# Patient Record
Sex: FEMALE | Race: WHITE | NOT HISPANIC OR LATINO | Employment: FULL TIME | URBAN - METROPOLITAN AREA
[De-identification: names, ages, dates, MRNs, and addresses within clinical notes are randomized per-mention and may not be internally consistent; named-entity substitution may affect disease eponyms.]

---

## 2017-11-12 ENCOUNTER — OFFICE VISIT (OUTPATIENT)
Dept: URGENT CARE | Facility: CLINIC | Age: 37
End: 2017-11-12
Payer: COMMERCIAL

## 2017-11-12 VITALS
HEIGHT: 62 IN | RESPIRATION RATE: 16 BRPM | WEIGHT: 118 LBS | DIASTOLIC BLOOD PRESSURE: 65 MMHG | TEMPERATURE: 97 F | SYSTOLIC BLOOD PRESSURE: 101 MMHG | OXYGEN SATURATION: 98 % | BODY MASS INDEX: 21.71 KG/M2 | HEART RATE: 91 BPM

## 2017-11-12 DIAGNOSIS — J02.9 ACUTE VIRAL PHARYNGITIS: Primary | ICD-10-CM

## 2017-11-12 DIAGNOSIS — R05.9 COUGH: ICD-10-CM

## 2017-11-12 LAB
CTP QC/QA: YES
S PYO RRNA THROAT QL PROBE: NEGATIVE

## 2017-11-12 PROCEDURE — 87880 STREP A ASSAY W/OPTIC: CPT | Mod: QW,S$GLB,, | Performed by: EMERGENCY MEDICINE

## 2017-11-12 PROCEDURE — 99203 OFFICE O/P NEW LOW 30 MIN: CPT | Mod: S$GLB,,, | Performed by: EMERGENCY MEDICINE

## 2017-11-12 RX ORDER — CODEINE PHOSPHATE AND GUAIFENESIN 10; 100 MG/5ML; MG/5ML
5-10 SOLUTION ORAL 3 TIMES DAILY PRN
Qty: 240 ML | Refills: 0 | Status: SHIPPED | OUTPATIENT
Start: 2017-11-12 | End: 2017-11-17

## 2017-11-12 NOTE — LETTER
November 12, 2017      [facility]  [address] [address], [address]  [place] [address]  Phone: [phone]  Fax: [phone]       Patient: Nickie Clemente   YOB: 1980  Date of Visit: 11/12/2017    To Whom It May Concern:    Breana Clemente  was at [facility] on 11/12/2017. She may return to work/school on 11/14/17 with no restrictions. If you have any questions or concerns, or if I can be of further assistance, please do not hesitate to contact me.    Sincerely,          Francisco Farr MD

## 2017-11-12 NOTE — PROGRESS NOTES
"Subjective:       Patient ID: Nickie Clemente is a 37 y.o. female.    Vitals:  height is 5' 2" (1.575 m) and weight is 53.5 kg (118 lb). Her oral temperature is 97 °F (36.1 °C). Her blood pressure is 101/65 and her pulse is 91. Her respiration is 16 and oxygen saturation is 98%.     Chief Complaint: Cough    Patient states she took motrin for the headache which gave her relief.  2 d hx sore throat, and occas mucus prod cough since yesterday, no fever/runny nose, is not pregnant, NOC.  Undecided about steroid IM.      Cough   This is a new problem. The current episode started yesterday. The problem has been gradually worsening. The problem occurs every few minutes. The cough is productive of sputum. Associated symptoms include headaches and a sore throat. Pertinent negatives include no chest pain, chills, ear pain, eye redness, fever, myalgias, shortness of breath or wheezing. The symptoms are aggravated by lying down. She has tried nothing for the symptoms.     Review of Systems   Constitution: Negative for chills, fever and malaise/fatigue.   HENT: Positive for sore throat. Negative for congestion, ear pain and hoarse voice.    Eyes: Negative for discharge and redness.   Cardiovascular: Negative for chest pain, dyspnea on exertion and leg swelling.   Respiratory: Positive for sputum production. Negative for cough, shortness of breath and wheezing.    Musculoskeletal: Negative for myalgias.   Gastrointestinal: Negative for abdominal pain and nausea.   Neurological: Positive for headaches.       Objective:      Physical Exam   Constitutional: She is oriented to person, place, and time. She appears well-developed and well-nourished.   Occas nonprod cough   HENT:   Head: Normocephalic and atraumatic.   Right Ear: Tympanic membrane, external ear and ear canal normal.   Left Ear: Tympanic membrane, external ear and ear canal normal.   Nose: Nose normal. Right sinus exhibits no maxillary sinus tenderness and no " frontal sinus tenderness. Left sinus exhibits no maxillary sinus tenderness and no frontal sinus tenderness.   Mouth/Throat: Uvula is midline and mucous membranes are normal. Posterior oropharyngeal erythema present.   Eyes: EOM are normal. Pupils are equal, round, and reactive to light.   Neck: Normal range of motion. Neck supple.   Cardiovascular: Normal rate, regular rhythm and normal heart sounds.    Pulmonary/Chest: Breath sounds normal.   Musculoskeletal: Normal range of motion.   Lymphadenopathy:     She has no cervical adenopathy.   Neurological: She is alert and oriented to person, place, and time.   Skin: Skin is warm and dry.   Psychiatric: She has a normal mood and affect. Her behavior is normal.       Assessment:       1. Acute viral pharyngitis    2. Cough        Plan:         Acute viral pharyngitis  -     POCT rapid strep A    Cough  -     guaifenesin-codeine 100-10 mg/5 ml (TUSSI-ORGANIDIN NR)  mg/5 mL syrup; Take 5-10 mLs by mouth 3 (three) times daily as needed for Cough.  Dispense: 240 mL; Refill: 0      Francisco Farr MD  Go to the Emergency Department for any problems

## 2017-11-12 NOTE — PATIENT INSTRUCTIONS

## 2017-11-15 ENCOUNTER — TELEPHONE (OUTPATIENT)
Dept: URGENT CARE | Facility: CLINIC | Age: 37
End: 2017-11-15

## 2023-04-14 ENCOUNTER — CLINICAL SUPPORT (OUTPATIENT)
Dept: OBSTETRICS AND GYNECOLOGY | Facility: CLINIC | Age: 43
End: 2023-04-14
Payer: COMMERCIAL

## 2023-04-14 DIAGNOSIS — N91.2 AMENORRHEA: Primary | ICD-10-CM

## 2023-05-11 ENCOUNTER — OFFICE VISIT (OUTPATIENT)
Dept: OBSTETRICS AND GYNECOLOGY | Facility: CLINIC | Age: 43
End: 2023-05-11
Payer: COMMERCIAL

## 2023-05-11 ENCOUNTER — HOSPITAL ENCOUNTER (OUTPATIENT)
Dept: PERINATAL CARE | Facility: OTHER | Age: 43
Discharge: HOME | End: 2023-05-11
Attending: OBSTETRICS & GYNECOLOGY
Payer: COMMERCIAL

## 2023-05-11 VITALS
WEIGHT: 132.94 LBS | BODY MASS INDEX: 24.46 KG/M2 | SYSTOLIC BLOOD PRESSURE: 112 MMHG | HEIGHT: 62 IN | DIASTOLIC BLOOD PRESSURE: 74 MMHG

## 2023-05-11 DIAGNOSIS — B00.1 FEVER BLISTER: ICD-10-CM

## 2023-05-11 DIAGNOSIS — N91.2 AMENORRHEA: ICD-10-CM

## 2023-05-11 DIAGNOSIS — Z32.00 VISIT FOR CONFIRMATION OF PREGNANCY TEST RESULT WITH PHYSICAL EXAM: Primary | ICD-10-CM

## 2023-05-11 DIAGNOSIS — Z12.4 CERVICAL CANCER SCREENING: ICD-10-CM

## 2023-05-11 LAB
B-HCG UR QL: POSITIVE
CTP QC/QA: YES

## 2023-05-11 PROCEDURE — 99999 PR PBB SHADOW E&M-EST. PATIENT-LVL III: CPT | Mod: PBBFAC,,, | Performed by: OBSTETRICS & GYNECOLOGY

## 2023-05-11 PROCEDURE — 3078F DIAST BP <80 MM HG: CPT | Mod: CPTII,S$GLB,, | Performed by: OBSTETRICS & GYNECOLOGY

## 2023-05-11 PROCEDURE — 99204 PR OFFICE/OUTPT VISIT, NEW, LEVL IV, 45-59 MIN: ICD-10-PCS | Mod: S$GLB,,, | Performed by: OBSTETRICS & GYNECOLOGY

## 2023-05-11 PROCEDURE — 76801 OB US < 14 WKS SINGLE FETUS: CPT | Mod: 26,,, | Performed by: OBSTETRICS & GYNECOLOGY

## 2023-05-11 PROCEDURE — 76801 US OB/GYN PROCEDURE (VIEWPOINT): ICD-10-PCS | Mod: 26,,, | Performed by: OBSTETRICS & GYNECOLOGY

## 2023-05-11 PROCEDURE — 3074F PR MOST RECENT SYSTOLIC BLOOD PRESSURE < 130 MM HG: ICD-10-PCS | Mod: CPTII,S$GLB,, | Performed by: OBSTETRICS & GYNECOLOGY

## 2023-05-11 PROCEDURE — 88175 CYTOPATH C/V AUTO FLUID REDO: CPT | Performed by: OBSTETRICS & GYNECOLOGY

## 2023-05-11 PROCEDURE — 99204 OFFICE O/P NEW MOD 45 MIN: CPT | Mod: S$GLB,,, | Performed by: OBSTETRICS & GYNECOLOGY

## 2023-05-11 PROCEDURE — 87624 HPV HI-RISK TYP POOLED RSLT: CPT | Performed by: OBSTETRICS & GYNECOLOGY

## 2023-05-11 PROCEDURE — 3074F SYST BP LT 130 MM HG: CPT | Mod: CPTII,S$GLB,, | Performed by: OBSTETRICS & GYNECOLOGY

## 2023-05-11 PROCEDURE — 81025 URINE PREGNANCY TEST: CPT | Mod: S$GLB,,, | Performed by: OBSTETRICS & GYNECOLOGY

## 2023-05-11 PROCEDURE — 76801 OB US < 14 WKS SINGLE FETUS: CPT

## 2023-05-11 PROCEDURE — 87086 URINE CULTURE/COLONY COUNT: CPT | Performed by: OBSTETRICS & GYNECOLOGY

## 2023-05-11 PROCEDURE — 87591 N.GONORRHOEAE DNA AMP PROB: CPT | Performed by: OBSTETRICS & GYNECOLOGY

## 2023-05-11 PROCEDURE — 3008F PR BODY MASS INDEX (BMI) DOCUMENTED: ICD-10-PCS | Mod: CPTII,S$GLB,, | Performed by: OBSTETRICS & GYNECOLOGY

## 2023-05-11 PROCEDURE — 81025 POCT URINE PREGNANCY: ICD-10-PCS | Mod: S$GLB,,, | Performed by: OBSTETRICS & GYNECOLOGY

## 2023-05-11 PROCEDURE — 99999 PR PBB SHADOW E&M-EST. PATIENT-LVL III: ICD-10-PCS | Mod: PBBFAC,,, | Performed by: OBSTETRICS & GYNECOLOGY

## 2023-05-11 PROCEDURE — 3008F BODY MASS INDEX DOCD: CPT | Mod: CPTII,S$GLB,, | Performed by: OBSTETRICS & GYNECOLOGY

## 2023-05-11 PROCEDURE — 3078F PR MOST RECENT DIASTOLIC BLOOD PRESSURE < 80 MM HG: ICD-10-PCS | Mod: CPTII,S$GLB,, | Performed by: OBSTETRICS & GYNECOLOGY

## 2023-05-11 RX ORDER — VALACYCLOVIR HYDROCHLORIDE 1 G/1
2000 TABLET, FILM COATED ORAL 2 TIMES DAILY
Qty: 4 TABLET | Refills: 6 | Status: SHIPPED | OUTPATIENT
Start: 2023-05-11 | End: 2023-10-31

## 2023-05-11 NOTE — PROGRESS NOTES
Chief Complaint: Absence of Menses     HPI:      Nickie Clemente is a 42 y.o.  who presents complaining of absence of menses. Mild nausea and no emesis.  Mild right sided pelvic pain.  Denies cramping.  Denies vaginal bleeding.     Patient's last menstrual period was 2023.    Flu shot: declines  COVID vaccine: declined  ZIKA travel or exposure: denies    History reviewed. No pertinent past medical history.  Past Surgical History:   Procedure Laterality Date     SECTION      x2    LUMPECTOMY, BREAST Right      Social History     Tobacco Use    Smoking status: Never    Smokeless tobacco: Never   Substance Use Topics    Alcohol use: No    Drug use: Never     Family History   Problem Relation Age of Onset    Diabetes Mother     Heart disease Mother     Glaucoma Father     Breast cancer Neg Hx     Ovarian cancer Neg Hx     Colon cancer Neg Hx      OB History    Para Term  AB Living   3         3   SAB IAB Ectopic Multiple Live Births           3      # Outcome Date GA Lbr Ector/2nd Weight Sex Delivery Anes PTL Lv   3             2             1                Obstetric Comments   13/R/6   No abnormal pap   No STDs    x 1, CD x 2       Current Outpatient Medications:     prenatal vit37/iron/folic acid (PRENATA ORAL), Take by mouth., Disp: , Rfl:     ondansetron (ZOFRAN-ODT) 4 MG TbDL, Take 1 tablet (4 mg total) by mouth every 8 (eight) hours as needed., Disp: 14 tablet, Rfl: 1    valACYclovir (VALTREX) 1000 MG tablet, Take 2 tablets (2,000 mg total) by mouth 2 (two) times daily. for 1 day, Disp: 4 tablet, Rfl: 6  ROS:     GENERAL: Denies weight gain or weight loss. Feeling well overall.   SKIN: Denies rash or lesions.   BREASTS: Denies lumps or nipple discharge. + tenderness.  ABDOMEN: Denies abdominal pain, constipation, diarrhea. nausea and no vomiting  URINARY: Denies dysuria, hematuria. + frequency.  NEUROLOGIC: Denies syncope or weakness.   PSYCHIATRIC:  "Denies depression, anxiety or mood swings.    Physical Exam:      PHYSICAL EXAM:  /74   Ht 5' 2" (1.575 m)   Wt 60.3 kg (132 lb 15 oz)   LMP 2023   BMI 24.31 kg/m²   Body mass index is 24.31 kg/m².     APPEARANCE: Well nourished, well developed, in no acute distress.  PSYCH: Appropriate mood and affect.  EXTREMITIES: No edema.  PELVIC: Vulva without lesions. Vagina without lesions, discharge or bleeding. Cervical os closed without lesions, bleeding or discharge.    Results for orders placed or performed in visit on 23   POCT Urine Pregnancy   Result Value Ref Range    POC Preg Test, Ur Positive (A) Negative     Acceptable Yes        Assessment/Plan:       ICD-10-CM ICD-9-CM    1. Visit for confirmation of pregnancy test result with physical exam  Z32.00 V72.40 POCT Urine Pregnancy      C. trachomatis/N. gonorrhoeae by AMP DNA Other; Cervix      CBC Auto Differential      Hepatitis B Surface Antigen      HIV 1/2 Ag/Ab (4th Gen)      RPR      Rubella Antibody, IgG      TSH      Type & Screen - Ob Profile      Urine culture      Hepatitis C Antibody      2. Cervical cancer screening  Z12.4 V76.2 Liquid-Based Pap Smear, Screening      HPV High Risk Genotypes, PCR      3. Fever blister  B00.1 054.9 valACYclovir (VALTREX) 1000 MG tablet            Counselin. UPT positive.  EDC by LMP: 2023, 10w3d.   2. OB panel and dating US ordered.   3. SAB precautions reviewed.   4. Continue prenatal vitamin options and folic acid (i.e. stool softener, DHA).   5. Return to clinic in 1-2 weeks for initial obstetric visit.      Use of the [employer] Patient Portal discussed and encouraged during today's visit.         Dory Leroy MD  [facility]  2023    "

## 2023-05-12 LAB
BACTERIA UR CULT: NO GROWTH
C TRACH DNA SPEC QL NAA+PROBE: NOT DETECTED
N GONORRHOEA DNA SPEC QL NAA+PROBE: NOT DETECTED

## 2023-05-30 ENCOUNTER — INITIAL PRENATAL (OUTPATIENT)
Dept: OBSTETRICS AND GYNECOLOGY | Facility: CLINIC | Age: 43
End: 2023-05-30
Payer: COMMERCIAL

## 2023-05-30 VITALS
BODY MASS INDEX: 25.08 KG/M2 | DIASTOLIC BLOOD PRESSURE: 68 MMHG | WEIGHT: 137.13 LBS | SYSTOLIC BLOOD PRESSURE: 114 MMHG

## 2023-05-30 DIAGNOSIS — Z3A.14 14 WEEKS GESTATION OF PREGNANCY: ICD-10-CM

## 2023-05-30 DIAGNOSIS — O09.92 SUPERVISION OF HIGH RISK PREGNANCY IN SECOND TRIMESTER: ICD-10-CM

## 2023-05-30 DIAGNOSIS — O09.522 MULTIGRAVIDA OF ADVANCED MATERNAL AGE IN SECOND TRIMESTER: ICD-10-CM

## 2023-05-30 DIAGNOSIS — O09.32 INITIAL OBSTETRIC VISIT IN SECOND TRIMESTER: Primary | ICD-10-CM

## 2023-05-30 PROCEDURE — 0502F PR SUBSEQUENT PRENATAL CARE: ICD-10-PCS | Mod: CPTII,S$GLB,, | Performed by: OBSTETRICS & GYNECOLOGY

## 2023-05-30 PROCEDURE — 0502F SUBSEQUENT PRENATAL CARE: CPT | Mod: CPTII,S$GLB,, | Performed by: OBSTETRICS & GYNECOLOGY

## 2023-05-30 NOTE — PROGRESS NOTES
Patient seen and examined.  Reports occasional headache.  Has isolated episode of scant, dark brown bleeding. Denies LOF, cramping.     Breast exam: Symmetrical, no skin changes or visible lesions.  No palpable masses or nipple discharge bilaterally.    1. Dating US and initial OB labs reviewed.  2. Patient was counseled today on proper weight gain based on the [employer]'s recommendations based on her pre-pregnancy weight.     The patient's Body mass index is Body mass index is 25.08 kg/m².  -- Discussed IOM recommended weight gain of:          Weight category  Pre pre BMI                 Recommended TWG          Underweight Less than 18.5             28-40            Normal Weight  18.5-24.9                     25-35            Overweight        25-29.9                        15-25            Obese                 30 and greater              11-20    -- Discussed criteria for delivery at [facility] r/t excessive pre-preg weight or excessive weight gain:          Pre-pregnancy BMI over 40 or excess pregnancy weight gain defined as:          Pre-preg BMI < 18.5; Excess weight gain = > 60 pound          Pre-preg BMI 18.5-24.9;  Excess weight gain = > 53 pounds          Pre-preg BMI 25-29.9;  Excess weight gain = > 38 pounds          Pre-preg BMI > 30;  Excess weight gain = > 30 pounds    3. Discussed foods to avoid in pregnancy (i.e. sushi, fish that are high in mercury, deli meat, and unpasteurized cheeses).   4. Discussed prenatal vitamin options and folic acid (i.e. stool softener, DHA).   5. Discussed nausea and emesis in pregnancy.  Recommend Unisom/B6.  Patient to notify the clinic if symptoms are refractory.   6. Optional genetic testing discussed - patient declines.   7. Optional carrier screening discussed - patient declines.  8. Safety of exercise discussed with patient, and continued active lifestyle encouraged.  9. Coronavirus, Influenza, and Zika virus precautions for both patient and her  spouse.  10. Normal course of prenatal care reviewed.  11. Medications safe in pregnancy discussed and list provided.  12. Enrolled in [employer].  13. RTC 4 weeks for routine OB visit.         Dory Leroy MD  [facility]  05/30/2023

## 2023-06-09 ENCOUNTER — PATIENT MESSAGE (OUTPATIENT)
Dept: OTHER | Facility: OTHER | Age: 43
End: 2023-06-09
Payer: COMMERCIAL

## 2023-06-14 ENCOUNTER — PATIENT MESSAGE (OUTPATIENT)
Dept: OBSTETRICS AND GYNECOLOGY | Facility: CLINIC | Age: 43
End: 2023-06-14
Payer: COMMERCIAL

## 2023-06-16 ENCOUNTER — PATIENT MESSAGE (OUTPATIENT)
Dept: OTHER | Facility: OTHER | Age: 43
End: 2023-06-16
Payer: COMMERCIAL

## 2023-06-20 ENCOUNTER — ROUTINE PRENATAL (OUTPATIENT)
Dept: OBSTETRICS AND GYNECOLOGY | Facility: CLINIC | Age: 43
End: 2023-06-20
Payer: COMMERCIAL

## 2023-06-20 VITALS
DIASTOLIC BLOOD PRESSURE: 66 MMHG | WEIGHT: 139.13 LBS | SYSTOLIC BLOOD PRESSURE: 116 MMHG | BODY MASS INDEX: 25.44 KG/M2

## 2023-06-20 DIAGNOSIS — O09.92 SUPERVISION OF HIGH RISK PREGNANCY IN SECOND TRIMESTER: Primary | ICD-10-CM

## 2023-06-20 DIAGNOSIS — O09.522 MULTIGRAVIDA OF ADVANCED MATERNAL AGE IN SECOND TRIMESTER: ICD-10-CM

## 2023-06-20 DIAGNOSIS — Z3A.17 17 WEEKS GESTATION OF PREGNANCY: ICD-10-CM

## 2023-06-20 PROCEDURE — 0502F PR SUBSEQUENT PRENATAL CARE: ICD-10-PCS | Mod: CPTII,S$GLB,, | Performed by: OBSTETRICS & GYNECOLOGY

## 2023-06-20 PROCEDURE — 0502F SUBSEQUENT PRENATAL CARE: CPT | Mod: CPTII,S$GLB,, | Performed by: OBSTETRICS & GYNECOLOGY

## 2023-06-20 NOTE — PROGRESS NOTES
Patient seen and examined.  No complaints. + flutters. Denies VB, LOF, cramping.  Seeing PT for tendonitis.  Anatomy US scheduled. RTC 4 weeks.

## 2023-07-03 ENCOUNTER — PATIENT MESSAGE (OUTPATIENT)
Dept: MATERNAL FETAL MEDICINE | Facility: CLINIC | Age: 43
End: 2023-07-03
Payer: COMMERCIAL

## 2023-07-05 ENCOUNTER — PROCEDURE VISIT (OUTPATIENT)
Dept: MATERNAL FETAL MEDICINE | Facility: CLINIC | Age: 43
End: 2023-07-05
Payer: COMMERCIAL

## 2023-07-05 ENCOUNTER — OFFICE VISIT (OUTPATIENT)
Dept: MATERNAL FETAL MEDICINE | Facility: CLINIC | Age: 43
End: 2023-07-05
Payer: COMMERCIAL

## 2023-07-05 DIAGNOSIS — O09.92 SUPERVISION OF HIGH RISK PREGNANCY IN SECOND TRIMESTER: ICD-10-CM

## 2023-07-05 DIAGNOSIS — O35.03X0 CHOROID PLEXUS CYST OF FETUS AFFECTING CARE OF MOTHER, ANTEPARTUM, SINGLE OR UNSPECIFIED FETUS: Primary | ICD-10-CM

## 2023-07-05 DIAGNOSIS — O09.92 SUPERVISION OF HIGH RISK PREGNANCY IN SECOND TRIMESTER: Primary | ICD-10-CM

## 2023-07-05 PROCEDURE — 99213 OFFICE O/P EST LOW 20 MIN: CPT | Mod: 25,S$GLB,, | Performed by: OBSTETRICS & GYNECOLOGY

## 2023-07-05 PROCEDURE — 76811 OB US DETAILED SNGL FETUS: CPT | Mod: S$GLB,,, | Performed by: OBSTETRICS & GYNECOLOGY

## 2023-07-05 PROCEDURE — 76811 US MFM PROCEDURE (VIEWPOINT): ICD-10-PCS | Mod: S$GLB,,, | Performed by: OBSTETRICS & GYNECOLOGY

## 2023-07-05 PROCEDURE — 99213 PR OFFICE/OUTPT VISIT, EST, LEVL III, 20-29 MIN: ICD-10-PCS | Mod: 25,S$GLB,, | Performed by: OBSTETRICS & GYNECOLOGY

## 2023-07-05 NOTE — PROGRESS NOTES
Please see imaging tab for Viewpoint study performed today  A detailed fetal anatomic ultrasound examination was performed today due to the following high risk indication: AMA.   A unilateral choroid plexus cyst is seen as noted above.  No fetal structural abnormalities are identified today, and fetal size is appropriate for EGA.   Cervical length by TA scanning is normal.   Placental location is anterior without evidence of previa.       See brief consult note on Viewpoint study.    20 minutes of total time spent on the encounter, which includes face to face time and non-face to face time preparing to see the patient (eg, review of tests), obtaining and/or reviewing separately obtained history, documenting clinical information in the electronic or other health record, independently interpreting results (not separately reported) and communicating results to the patient/family/caregiver, or care coordination (not separately reported).

## 2023-07-07 ENCOUNTER — PATIENT MESSAGE (OUTPATIENT)
Dept: OTHER | Facility: OTHER | Age: 43
End: 2023-07-07
Payer: COMMERCIAL

## 2023-07-18 ENCOUNTER — ROUTINE PRENATAL (OUTPATIENT)
Dept: OBSTETRICS AND GYNECOLOGY | Facility: CLINIC | Age: 43
End: 2023-07-18
Payer: COMMERCIAL

## 2023-07-18 VITALS
SYSTOLIC BLOOD PRESSURE: 118 MMHG | DIASTOLIC BLOOD PRESSURE: 60 MMHG | BODY MASS INDEX: 26.33 KG/M2 | WEIGHT: 143.94 LBS

## 2023-07-18 DIAGNOSIS — O09.92 SUPERVISION OF HIGH RISK PREGNANCY IN SECOND TRIMESTER: Primary | ICD-10-CM

## 2023-07-18 DIAGNOSIS — Z3A.21 21 WEEKS GESTATION OF PREGNANCY: ICD-10-CM

## 2023-07-18 DIAGNOSIS — O09.522 MULTIGRAVIDA OF ADVANCED MATERNAL AGE IN SECOND TRIMESTER: ICD-10-CM

## 2023-07-18 PROCEDURE — 0502F SUBSEQUENT PRENATAL CARE: CPT | Mod: CPTII,S$GLB,, | Performed by: OBSTETRICS & GYNECOLOGY

## 2023-07-18 PROCEDURE — 0502F PR SUBSEQUENT PRENATAL CARE: ICD-10-PCS | Mod: CPTII,S$GLB,, | Performed by: OBSTETRICS & GYNECOLOGY

## 2023-07-18 NOTE — PROGRESS NOTES
Patient seen and examined.  No complaints. +FM. Denies VB, LOF, contractions.  Reviewed anatomy US report.  CBC, GCT ordered for 24 weeks.  RTC 4 weeks for routine prenatal care. Growth US and PNT at 32 weeks.

## 2023-07-31 ENCOUNTER — TELEPHONE (OUTPATIENT)
Dept: OBSTETRICS AND GYNECOLOGY | Facility: CLINIC | Age: 43
End: 2023-07-31
Payer: COMMERCIAL

## 2023-07-31 NOTE — TELEPHONE ENCOUNTER
----- Message from Niesha Cortes MA sent at 7/31/2023  1:32 PM CDT -----  Name of Who is Calling:TED HOPSON [[id number]]                What is the request in detail: Pt is requesting a call back to discuss all the upcoming appointments scheduled. Please assist.                Can the clinic reply by [facility]: No                What Number to Call Back if not in [facility]: TED HOPSON [[id number]]

## 2023-08-04 ENCOUNTER — PATIENT MESSAGE (OUTPATIENT)
Dept: OTHER | Facility: OTHER | Age: 43
End: 2023-08-04
Payer: COMMERCIAL

## 2023-08-15 ENCOUNTER — ROUTINE PRENATAL (OUTPATIENT)
Dept: OBSTETRICS AND GYNECOLOGY | Facility: CLINIC | Age: 43
End: 2023-08-15
Payer: COMMERCIAL

## 2023-08-15 VITALS
WEIGHT: 150.56 LBS | BODY MASS INDEX: 27.54 KG/M2 | DIASTOLIC BLOOD PRESSURE: 66 MMHG | SYSTOLIC BLOOD PRESSURE: 110 MMHG

## 2023-08-15 DIAGNOSIS — O09.522 MULTIGRAVIDA OF ADVANCED MATERNAL AGE IN SECOND TRIMESTER: ICD-10-CM

## 2023-08-15 DIAGNOSIS — Z3A.25 25 WEEKS GESTATION OF PREGNANCY: ICD-10-CM

## 2023-08-15 DIAGNOSIS — O26.842 SIZE OF FETUS INCONSISTENT WITH DATES IN SECOND TRIMESTER: Primary | ICD-10-CM

## 2023-08-15 DIAGNOSIS — O09.92 SUPERVISION OF HIGH RISK PREGNANCY IN SECOND TRIMESTER: ICD-10-CM

## 2023-08-15 PROCEDURE — 0502F SUBSEQUENT PRENATAL CARE: CPT | Mod: CPTII,S$GLB,, | Performed by: OBSTETRICS & GYNECOLOGY

## 2023-08-15 PROCEDURE — 0502F PR SUBSEQUENT PRENATAL CARE: ICD-10-PCS | Mod: CPTII,S$GLB,, | Performed by: OBSTETRICS & GYNECOLOGY

## 2023-08-15 NOTE — PROGRESS NOTES
Patient seen and examined.  No complaints. + FM. Denies VB, LOF, contractions.  S>D.  Growth US ordered.  Prior us reviewed. Reviewed recommendation to start PNT at 32 weeks due to age > 40. RTC 3-4 weeks for routine prenatal care.

## 2023-08-16 ENCOUNTER — PATIENT MESSAGE (OUTPATIENT)
Dept: OBSTETRICS AND GYNECOLOGY | Facility: CLINIC | Age: 43
End: 2023-08-16
Payer: COMMERCIAL

## 2023-08-16 ENCOUNTER — PATIENT MESSAGE (OUTPATIENT)
Dept: MATERNAL FETAL MEDICINE | Facility: CLINIC | Age: 43
End: 2023-08-16
Payer: COMMERCIAL

## 2023-08-18 ENCOUNTER — PATIENT MESSAGE (OUTPATIENT)
Dept: OTHER | Facility: OTHER | Age: 43
End: 2023-08-18
Payer: COMMERCIAL

## 2023-08-22 ENCOUNTER — PROCEDURE VISIT (OUTPATIENT)
Dept: MATERNAL FETAL MEDICINE | Facility: CLINIC | Age: 43
End: 2023-08-22
Payer: COMMERCIAL

## 2023-08-22 DIAGNOSIS — O26.842 SIZE OF FETUS INCONSISTENT WITH DATES IN SECOND TRIMESTER: ICD-10-CM

## 2023-08-22 PROCEDURE — 76816 OB US FOLLOW-UP PER FETUS: CPT | Mod: S$GLB,,, | Performed by: OBSTETRICS & GYNECOLOGY

## 2023-08-22 PROCEDURE — 76816 US MFM PROCEDURE (VIEWPOINT): ICD-10-PCS | Mod: S$GLB,,, | Performed by: OBSTETRICS & GYNECOLOGY

## 2023-08-23 ENCOUNTER — TELEPHONE (OUTPATIENT)
Dept: OBSTETRICS AND GYNECOLOGY | Facility: CLINIC | Age: 43
End: 2023-08-23
Payer: COMMERCIAL

## 2023-08-23 NOTE — TELEPHONE ENCOUNTER
Called to review US and MFM recommendations for growth and fluid check in 4 weeks.        Dory Leroy MD  [facility]  08/23/2023

## 2023-08-25 DIAGNOSIS — O09.92 SUPERVISION OF HIGH RISK PREGNANCY IN SECOND TRIMESTER: Primary | ICD-10-CM

## 2023-08-25 DIAGNOSIS — O09.522 MULTIGRAVIDA OF ADVANCED MATERNAL AGE IN SECOND TRIMESTER: ICD-10-CM

## 2023-08-25 DIAGNOSIS — O40.2XX1 POLYHYDRAMNIOS IN SECOND TRIMESTER, FETUS 1 OF MULTIPLE GESTATION: ICD-10-CM

## 2023-08-30 ENCOUNTER — TELEPHONE (OUTPATIENT)
Dept: OBSTETRICS AND GYNECOLOGY | Facility: CLINIC | Age: 43
End: 2023-08-30
Payer: COMMERCIAL

## 2023-08-30 NOTE — TELEPHONE ENCOUNTER
----- Message from Kavita Cui sent at 8/30/2023  9:20 AM CDT -----   Name of Who is Calling:     What is the request in detail:  patient request call back in reference to scheduled appointments concerns Please contact to further discuss and advise      Can the clinic reply by [facility]:     What [facility]:  [phone]

## 2023-09-01 ENCOUNTER — PATIENT MESSAGE (OUTPATIENT)
Dept: OTHER | Facility: OTHER | Age: 43
End: 2023-09-01
Payer: COMMERCIAL

## 2023-09-15 ENCOUNTER — PATIENT MESSAGE (OUTPATIENT)
Dept: OTHER | Facility: OTHER | Age: 43
End: 2023-09-15
Payer: COMMERCIAL

## 2023-09-19 ENCOUNTER — PROCEDURE VISIT (OUTPATIENT)
Dept: MATERNAL FETAL MEDICINE | Facility: CLINIC | Age: 43
End: 2023-09-19
Payer: COMMERCIAL

## 2023-09-19 ENCOUNTER — ROUTINE PRENATAL (OUTPATIENT)
Dept: OBSTETRICS AND GYNECOLOGY | Facility: CLINIC | Age: 43
End: 2023-09-19
Payer: COMMERCIAL

## 2023-09-19 VITALS — DIASTOLIC BLOOD PRESSURE: 66 MMHG | BODY MASS INDEX: 27.9 KG/M2 | WEIGHT: 152.56 LBS | SYSTOLIC BLOOD PRESSURE: 110 MMHG

## 2023-09-19 DIAGNOSIS — O09.523 MULTIGRAVIDA OF ADVANCED MATERNAL AGE IN THIRD TRIMESTER: ICD-10-CM

## 2023-09-19 DIAGNOSIS — O34.219 HISTORY OF CESAREAN DELIVERY, CURRENTLY PREGNANT: ICD-10-CM

## 2023-09-19 DIAGNOSIS — O09.93 SUPERVISION OF HIGH RISK PREGNANCY IN THIRD TRIMESTER: ICD-10-CM

## 2023-09-19 DIAGNOSIS — Z23 NEED FOR DIPHTHERIA-TETANUS-PERTUSSIS (TDAP) VACCINE: Primary | ICD-10-CM

## 2023-09-19 DIAGNOSIS — Z3A.30 30 WEEKS GESTATION OF PREGNANCY: ICD-10-CM

## 2023-09-19 DIAGNOSIS — O09.92 SUPERVISION OF HIGH RISK PREGNANCY IN SECOND TRIMESTER: ICD-10-CM

## 2023-09-19 PROCEDURE — 76816 US MFM PROCEDURE (VIEWPOINT): ICD-10-PCS | Mod: S$GLB,,, | Performed by: OBSTETRICS & GYNECOLOGY

## 2023-09-19 PROCEDURE — 0502F SUBSEQUENT PRENATAL CARE: CPT | Mod: CPTII,S$GLB,, | Performed by: OBSTETRICS & GYNECOLOGY

## 2023-09-19 PROCEDURE — 76816 OB US FOLLOW-UP PER FETUS: CPT | Mod: S$GLB,,, | Performed by: OBSTETRICS & GYNECOLOGY

## 2023-09-19 PROCEDURE — 0502F PR SUBSEQUENT PRENATAL CARE: ICD-10-PCS | Mod: CPTII,S$GLB,, | Performed by: OBSTETRICS & GYNECOLOGY

## 2023-09-19 NOTE — PROGRESS NOTES
Patient seen and examined. No complaints. + FM. Denies VB, LOF. Notes some C.  US today with normal PARISH per patient. Declines flu vaccine.  Tdap ordered. RTC 2 weeks and start PNT.

## 2023-09-21 DIAGNOSIS — Z36.89 ENCOUNTER FOR ULTRASOUND TO ASSESS FETAL GROWTH: Primary | ICD-10-CM

## 2023-09-29 ENCOUNTER — PATIENT MESSAGE (OUTPATIENT)
Dept: OTHER | Facility: OTHER | Age: 43
End: 2023-09-29
Payer: COMMERCIAL

## 2023-10-06 ENCOUNTER — ROUTINE PRENATAL (OUTPATIENT)
Dept: OBSTETRICS AND GYNECOLOGY | Facility: CLINIC | Age: 43
End: 2023-10-06
Payer: COMMERCIAL

## 2023-10-06 ENCOUNTER — HOSPITAL ENCOUNTER (OUTPATIENT)
Dept: PERINATAL CARE | Facility: OTHER | Age: 43
Discharge: HOME | End: 2023-10-06
Attending: OBSTETRICS & GYNECOLOGY
Payer: COMMERCIAL

## 2023-10-06 ENCOUNTER — CLINICAL SUPPORT (OUTPATIENT)
Dept: OBSTETRICS AND GYNECOLOGY | Facility: CLINIC | Age: 43
End: 2023-10-06
Payer: COMMERCIAL

## 2023-10-06 VITALS
DIASTOLIC BLOOD PRESSURE: 68 MMHG | WEIGHT: 155.44 LBS | BODY MASS INDEX: 28.43 KG/M2 | SYSTOLIC BLOOD PRESSURE: 120 MMHG

## 2023-10-06 DIAGNOSIS — O09.522 MULTIGRAVIDA OF ADVANCED MATERNAL AGE IN SECOND TRIMESTER: ICD-10-CM

## 2023-10-06 DIAGNOSIS — O40.2XX1 POLYHYDRAMNIOS IN SECOND TRIMESTER, FETUS 1 OF MULTIPLE GESTATION: ICD-10-CM

## 2023-10-06 DIAGNOSIS — Z23 NEED FOR DIPHTHERIA-TETANUS-PERTUSSIS (TDAP) VACCINE: ICD-10-CM

## 2023-10-06 DIAGNOSIS — Z3A.33 33 WEEKS GESTATION OF PREGNANCY: Primary | ICD-10-CM

## 2023-10-06 DIAGNOSIS — O09.92 SUPERVISION OF HIGH RISK PREGNANCY IN SECOND TRIMESTER: ICD-10-CM

## 2023-10-06 PROCEDURE — 76815 PRENATAL TESTING - NST/AFI: ICD-10-PCS | Mod: 26,,, | Performed by: OBSTETRICS & GYNECOLOGY

## 2023-10-06 PROCEDURE — 90471 TDAP VACCINE GREATER THAN OR EQUAL TO 7YO IM: ICD-10-PCS | Mod: S$GLB,,, | Performed by: OBSTETRICS & GYNECOLOGY

## 2023-10-06 PROCEDURE — 99999 PR PBB SHADOW E&M-EST. PATIENT-LVL III: ICD-10-PCS | Mod: PBBFAC,,, | Performed by: NURSE PRACTITIONER

## 2023-10-06 PROCEDURE — 59025 PRENATAL TESTING - NST/AFI: ICD-10-PCS | Mod: 26,,, | Performed by: OBSTETRICS & GYNECOLOGY

## 2023-10-06 PROCEDURE — 59025 FETAL NON-STRESS TEST: CPT

## 2023-10-06 PROCEDURE — 99999 PR PBB SHADOW E&M-EST. PATIENT-LVL III: CPT | Mod: PBBFAC,,, | Performed by: NURSE PRACTITIONER

## 2023-10-06 PROCEDURE — 99999 PR PBB SHADOW E&M-EST. PATIENT-LVL I: ICD-10-PCS | Mod: PBBFAC,,,

## 2023-10-06 PROCEDURE — 90715 TDAP VACCINE GREATER THAN OR EQUAL TO 7YO IM: ICD-10-PCS | Mod: S$GLB,,, | Performed by: OBSTETRICS & GYNECOLOGY

## 2023-10-06 PROCEDURE — 76815 OB US LIMITED FETUS(S): CPT | Mod: 26,,, | Performed by: OBSTETRICS & GYNECOLOGY

## 2023-10-06 PROCEDURE — 0502F PR SUBSEQUENT PRENATAL CARE: ICD-10-PCS | Mod: CPTII,S$GLB,, | Performed by: NURSE PRACTITIONER

## 2023-10-06 PROCEDURE — 59025 FETAL NON-STRESS TEST: CPT | Mod: 26,,, | Performed by: OBSTETRICS & GYNECOLOGY

## 2023-10-06 PROCEDURE — 0502F SUBSEQUENT PRENATAL CARE: CPT | Mod: CPTII,S$GLB,, | Performed by: NURSE PRACTITIONER

## 2023-10-06 PROCEDURE — 76815 OB US LIMITED FETUS(S): CPT

## 2023-10-06 PROCEDURE — 90471 IMMUNIZATION ADMIN: CPT | Mod: S$GLB,,, | Performed by: OBSTETRICS & GYNECOLOGY

## 2023-10-06 PROCEDURE — 99999 PR PBB SHADOW E&M-EST. PATIENT-LVL I: CPT | Mod: PBBFAC,,,

## 2023-10-06 PROCEDURE — 90715 TDAP VACCINE 7 YRS/> IM: CPT | Mod: S$GLB,,, | Performed by: OBSTETRICS & GYNECOLOGY

## 2023-10-06 RX ORDER — NAPROXEN SODIUM 220 MG/1
81 TABLET, FILM COATED ORAL DAILY
Qty: 30 TABLET | Refills: 0 | COMMUNITY
Start: 2023-10-06 | End: 2023-10-31

## 2023-10-06 NOTE — PROGRESS NOTES
Here for routine OB appt at 33w0d, with no complaints.  Reports good FM.  Denies LOF, denies VB, denies contractions.  Reviewed warning signs of Labor and Preeclampsia.  Daily FM counts reinforced.  F/U scheduled 2 weeks with me  3T labs today, GBS next visit  having a BOY- has three older children  tdap today, declines flu shot  pnt weekly- went today

## 2023-10-11 ENCOUNTER — HOSPITAL ENCOUNTER (OUTPATIENT)
Dept: PERINATAL CARE | Facility: OTHER | Age: 43
Discharge: HOME | End: 2023-10-11
Attending: OBSTETRICS & GYNECOLOGY
Payer: COMMERCIAL

## 2023-10-11 DIAGNOSIS — O09.522 MULTIGRAVIDA OF ADVANCED MATERNAL AGE IN SECOND TRIMESTER: ICD-10-CM

## 2023-10-11 DIAGNOSIS — O09.92 SUPERVISION OF HIGH RISK PREGNANCY IN SECOND TRIMESTER: ICD-10-CM

## 2023-10-11 DIAGNOSIS — O40.2XX1 POLYHYDRAMNIOS IN SECOND TRIMESTER, FETUS 1 OF MULTIPLE GESTATION: ICD-10-CM

## 2023-10-11 PROCEDURE — 59025 FETAL NON-STRESS TEST: CPT

## 2023-10-11 PROCEDURE — 59025 PRENATAL TESTING - NST/AFI: ICD-10-PCS | Mod: 26,,, | Performed by: OBSTETRICS & GYNECOLOGY

## 2023-10-11 PROCEDURE — 76815 OB US LIMITED FETUS(S): CPT

## 2023-10-11 PROCEDURE — 59025 FETAL NON-STRESS TEST: CPT | Mod: 26,,, | Performed by: OBSTETRICS & GYNECOLOGY

## 2023-10-11 PROCEDURE — 76815 PRENATAL TESTING - NST/AFI: ICD-10-PCS | Mod: 26,,, | Performed by: OBSTETRICS & GYNECOLOGY

## 2023-10-11 PROCEDURE — 76815 OB US LIMITED FETUS(S): CPT | Mod: 26,,, | Performed by: OBSTETRICS & GYNECOLOGY

## 2023-10-17 ENCOUNTER — PATIENT MESSAGE (OUTPATIENT)
Dept: OBSTETRICS AND GYNECOLOGY | Facility: CLINIC | Age: 43
End: 2023-10-17
Payer: COMMERCIAL

## 2023-10-18 ENCOUNTER — PROCEDURE VISIT (OUTPATIENT)
Dept: MATERNAL FETAL MEDICINE | Facility: CLINIC | Age: 43
End: 2023-10-18
Payer: COMMERCIAL

## 2023-10-18 ENCOUNTER — ROUTINE PRENATAL (OUTPATIENT)
Dept: OBSTETRICS AND GYNECOLOGY | Facility: CLINIC | Age: 43
End: 2023-10-18
Payer: COMMERCIAL

## 2023-10-18 VITALS
BODY MASS INDEX: 29.03 KG/M2 | DIASTOLIC BLOOD PRESSURE: 70 MMHG | WEIGHT: 158.75 LBS | SYSTOLIC BLOOD PRESSURE: 130 MMHG

## 2023-10-18 DIAGNOSIS — Z3A.34 34 WEEKS GESTATION OF PREGNANCY: Primary | ICD-10-CM

## 2023-10-18 DIAGNOSIS — Z36.89 ENCOUNTER FOR ULTRASOUND TO ASSESS FETAL GROWTH: ICD-10-CM

## 2023-10-18 PROCEDURE — 99999 PR PBB SHADOW E&M-EST. PATIENT-LVL III: ICD-10-PCS | Mod: PBBFAC,,, | Performed by: NURSE PRACTITIONER

## 2023-10-18 PROCEDURE — 76819 FETAL BIOPHYS PROFIL W/O NST: CPT | Mod: S$GLB,,, | Performed by: OBSTETRICS & GYNECOLOGY

## 2023-10-18 PROCEDURE — 76816 US MFM PROCEDURE (VIEWPOINT): ICD-10-PCS | Mod: S$GLB,,, | Performed by: OBSTETRICS & GYNECOLOGY

## 2023-10-18 PROCEDURE — 76819 US MFM PROCEDURE (VIEWPOINT): ICD-10-PCS | Mod: S$GLB,,, | Performed by: OBSTETRICS & GYNECOLOGY

## 2023-10-18 PROCEDURE — 0502F SUBSEQUENT PRENATAL CARE: CPT | Mod: CPTII,S$GLB,, | Performed by: NURSE PRACTITIONER

## 2023-10-18 PROCEDURE — 0502F PR SUBSEQUENT PRENATAL CARE: ICD-10-PCS | Mod: CPTII,S$GLB,, | Performed by: NURSE PRACTITIONER

## 2023-10-18 PROCEDURE — 99999 PR PBB SHADOW E&M-EST. PATIENT-LVL III: CPT | Mod: PBBFAC,,, | Performed by: NURSE PRACTITIONER

## 2023-10-18 PROCEDURE — 76816 OB US FOLLOW-UP PER FETUS: CPT | Mod: S$GLB,,, | Performed by: OBSTETRICS & GYNECOLOGY
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
## 2023-10-20 ENCOUNTER — PATIENT MESSAGE (OUTPATIENT)
Dept: OTHER | Facility: OTHER | Age: 43
End: 2023-10-20
Payer: COMMERCIAL

## 2023-10-25 ENCOUNTER — HOSPITAL ENCOUNTER (OUTPATIENT)
Dept: PERINATAL CARE | Facility: OTHER | Age: 43
Discharge: HOME | End: 2023-10-25
Attending: OBSTETRICS & GYNECOLOGY
Payer: COMMERCIAL

## 2023-10-25 DIAGNOSIS — O09.92 SUPERVISION OF HIGH RISK PREGNANCY IN SECOND TRIMESTER: ICD-10-CM

## 2023-10-25 DIAGNOSIS — O40.2XX1 POLYHYDRAMNIOS IN SECOND TRIMESTER, FETUS 1 OF MULTIPLE GESTATION: ICD-10-CM

## 2023-10-25 DIAGNOSIS — O09.522 MULTIGRAVIDA OF ADVANCED MATERNAL AGE IN SECOND TRIMESTER: ICD-10-CM

## 2023-10-25 PROCEDURE — 76815 OB US LIMITED FETUS(S): CPT

## 2023-10-25 PROCEDURE — 59025 FETAL NON-STRESS TEST: CPT

## 2023-10-25 PROCEDURE — 59025 PRENATAL TESTING - NST/AFI: ICD-10-PCS | Mod: 26,,, | Performed by: OBSTETRICS & GYNECOLOGY

## 2023-10-25 PROCEDURE — 76815 PRENATAL TESTING - NST/AFI: ICD-10-PCS | Mod: 26,,, | Performed by: OBSTETRICS & GYNECOLOGY

## 2023-10-25 PROCEDURE — 76815 OB US LIMITED FETUS(S): CPT | Mod: 26,,, | Performed by: OBSTETRICS & GYNECOLOGY

## 2023-10-25 PROCEDURE — 59025 FETAL NON-STRESS TEST: CPT | Mod: 26,,, | Performed by: OBSTETRICS & GYNECOLOGY

## 2023-10-31 ENCOUNTER — ROUTINE PRENATAL (OUTPATIENT)
Dept: OBSTETRICS AND GYNECOLOGY | Facility: CLINIC | Age: 43
End: 2023-10-31
Payer: COMMERCIAL

## 2023-10-31 ENCOUNTER — HOSPITAL ENCOUNTER (OUTPATIENT)
Dept: PERINATAL CARE | Facility: OTHER | Age: 43
Discharge: HOME | End: 2023-10-31
Attending: OBSTETRICS & GYNECOLOGY
Payer: COMMERCIAL

## 2023-10-31 VITALS
BODY MASS INDEX: 29.96 KG/M2 | SYSTOLIC BLOOD PRESSURE: 124 MMHG | WEIGHT: 163.81 LBS | DIASTOLIC BLOOD PRESSURE: 82 MMHG

## 2023-10-31 DIAGNOSIS — O09.522 MULTIGRAVIDA OF ADVANCED MATERNAL AGE IN SECOND TRIMESTER: ICD-10-CM

## 2023-10-31 DIAGNOSIS — O40.2XX1 POLYHYDRAMNIOS IN SECOND TRIMESTER, FETUS 1 OF MULTIPLE GESTATION: ICD-10-CM

## 2023-10-31 DIAGNOSIS — O09.92 SUPERVISION OF HIGH RISK PREGNANCY IN SECOND TRIMESTER: ICD-10-CM

## 2023-10-31 DIAGNOSIS — O09.93 SUPERVISION OF HIGH RISK PREGNANCY IN THIRD TRIMESTER: Primary | ICD-10-CM

## 2023-10-31 PROCEDURE — 87081 CULTURE SCREEN ONLY: CPT | Performed by: STUDENT IN AN ORGANIZED HEALTH CARE EDUCATION/TRAINING PROGRAM

## 2023-10-31 PROCEDURE — 0502F SUBSEQUENT PRENATAL CARE: CPT | Mod: CPTII,S$GLB,, | Performed by: STUDENT IN AN ORGANIZED HEALTH CARE EDUCATION/TRAINING PROGRAM

## 2023-10-31 PROCEDURE — 0502F PR SUBSEQUENT PRENATAL CARE: ICD-10-PCS | Mod: CPTII,S$GLB,, | Performed by: STUDENT IN AN ORGANIZED HEALTH CARE EDUCATION/TRAINING PROGRAM

## 2023-10-31 PROCEDURE — 59025 PRENATAL TESTING - NST/AFI: ICD-10-PCS | Mod: 26,,, | Performed by: OBSTETRICS & GYNECOLOGY

## 2023-10-31 PROCEDURE — 76815 OB US LIMITED FETUS(S): CPT

## 2023-10-31 PROCEDURE — 59025 FETAL NON-STRESS TEST: CPT | Mod: 26,,, | Performed by: OBSTETRICS & GYNECOLOGY

## 2023-10-31 PROCEDURE — 76815 OB US LIMITED FETUS(S): CPT | Mod: 26,,, | Performed by: OBSTETRICS & GYNECOLOGY

## 2023-10-31 PROCEDURE — 59025 FETAL NON-STRESS TEST: CPT

## 2023-10-31 PROCEDURE — 99999 PR PBB SHADOW E&M-EST. PATIENT-LVL III: ICD-10-PCS | Mod: PBBFAC,,, | Performed by: STUDENT IN AN ORGANIZED HEALTH CARE EDUCATION/TRAINING PROGRAM

## 2023-10-31 PROCEDURE — 76815 PRENATAL TESTING - NST/AFI: ICD-10-PCS | Mod: 26,,, | Performed by: OBSTETRICS & GYNECOLOGY

## 2023-10-31 PROCEDURE — 99999 PR PBB SHADOW E&M-EST. PATIENT-LVL III: CPT | Mod: PBBFAC,,, | Performed by: STUDENT IN AN ORGANIZED HEALTH CARE EDUCATION/TRAINING PROGRAM

## 2023-10-31 PROCEDURE — 87147 CULTURE TYPE IMMUNOLOGIC: CPT | Performed by: STUDENT IN AN ORGANIZED HEALTH CARE EDUCATION/TRAINING PROGRAM

## 2023-10-31 NOTE — PROGRESS NOTES
Pt doing ok. Some GERD, controlled with TUMS. Denies vaginal bleeding, LOF, contractions. Reports regular fetal movement. Denies symptoms of pre E.  VS reviewed.  Ordered today: GBS, 3 T labs   Labor and pre E precautions reviewed.   RTC: weekly

## 2023-10-31 NOTE — PATIENT INSTRUCTIONS
[facility], Labor and Delivery is on the 6th floor of [facility]: [phone]    [email or web address]

## 2023-11-02 PROBLEM — O99.820 GBS (GROUP B STREPTOCOCCUS CARRIER), +RV CULTURE, CURRENTLY PREGNANT: Status: ACTIVE | Noted: 2023-11-02

## 2023-11-02 LAB — BACTERIA SPEC AEROBE CULT: ABNORMAL

## 2023-11-08 ENCOUNTER — ROUTINE PRENATAL (OUTPATIENT)
Dept: OBSTETRICS AND GYNECOLOGY | Facility: CLINIC | Age: 43
End: 2023-11-08
Payer: COMMERCIAL

## 2023-11-08 ENCOUNTER — HOSPITAL ENCOUNTER (OUTPATIENT)
Dept: PERINATAL CARE | Facility: OTHER | Age: 43
Discharge: HOME | End: 2023-11-08
Attending: OBSTETRICS & GYNECOLOGY
Payer: COMMERCIAL

## 2023-11-08 VITALS — BODY MASS INDEX: 30.36 KG/M2 | DIASTOLIC BLOOD PRESSURE: 78 MMHG | SYSTOLIC BLOOD PRESSURE: 120 MMHG | WEIGHT: 166 LBS

## 2023-11-08 DIAGNOSIS — O09.93 SUPERVISION OF HIGH RISK PREGNANCY IN THIRD TRIMESTER: Primary | ICD-10-CM

## 2023-11-08 DIAGNOSIS — O09.522 MULTIGRAVIDA OF ADVANCED MATERNAL AGE IN SECOND TRIMESTER: ICD-10-CM

## 2023-11-08 DIAGNOSIS — Z3A.37 37 WEEKS GESTATION OF PREGNANCY: ICD-10-CM

## 2023-11-08 DIAGNOSIS — O09.523 MULTIGRAVIDA OF ADVANCED MATERNAL AGE IN THIRD TRIMESTER: ICD-10-CM

## 2023-11-08 DIAGNOSIS — O09.92 SUPERVISION OF HIGH RISK PREGNANCY IN SECOND TRIMESTER: ICD-10-CM

## 2023-11-08 DIAGNOSIS — O34.219 HISTORY OF CESAREAN DELIVERY, CURRENTLY PREGNANT: ICD-10-CM

## 2023-11-08 DIAGNOSIS — O40.2XX1 POLYHYDRAMNIOS IN SECOND TRIMESTER, FETUS 1 OF MULTIPLE GESTATION: ICD-10-CM

## 2023-11-08 PROCEDURE — 76815 PRENATAL TESTING - NST/AFI: ICD-10-PCS | Mod: 26,,, | Performed by: OBSTETRICS & GYNECOLOGY

## 2023-11-08 PROCEDURE — 76815 OB US LIMITED FETUS(S): CPT

## 2023-11-08 PROCEDURE — 76815 OB US LIMITED FETUS(S): CPT | Mod: 26,,, | Performed by: OBSTETRICS & GYNECOLOGY

## 2023-11-08 PROCEDURE — 0502F SUBSEQUENT PRENATAL CARE: CPT | Mod: CPTII,S$GLB,, | Performed by: OBSTETRICS & GYNECOLOGY

## 2023-11-08 PROCEDURE — 99999 PR PBB SHADOW E&M-EST. PATIENT-LVL II: CPT | Mod: PBBFAC,,, | Performed by: OBSTETRICS & GYNECOLOGY

## 2023-11-08 PROCEDURE — 59025 FETAL NON-STRESS TEST: CPT | Mod: 26,,, | Performed by: OBSTETRICS & GYNECOLOGY

## 2023-11-08 PROCEDURE — 59025 FETAL NON-STRESS TEST: CPT

## 2023-11-08 PROCEDURE — 99999 PR PBB SHADOW E&M-EST. PATIENT-LVL II: ICD-10-PCS | Mod: PBBFAC,,, | Performed by: OBSTETRICS & GYNECOLOGY

## 2023-11-08 PROCEDURE — 0502F PR SUBSEQUENT PRENATAL CARE: ICD-10-PCS | Mod: CPTII,S$GLB,, | Performed by: OBSTETRICS & GYNECOLOGY

## 2023-11-08 PROCEDURE — 59025 PRENATAL TESTING - NST/AFI: ICD-10-PCS | Mod: 26,,, | Performed by: OBSTETRICS & GYNECOLOGY

## 2023-11-08 NOTE — PROGRESS NOTES
Patient seen and examined. Notes increasing bilateral LE edema. + FM. Denies VB, LOF, contractions. GBS positive. Repeat CD scheduled for 39 weeks.  Instruction sheet reviewed and hibiclens provided. Continue PNT. Reviewed labor and PreE precautions. Advised patient to discuss RSV monoclonal antibody with pediatrician.

## 2023-11-16 ENCOUNTER — ANESTHESIA EVENT (OUTPATIENT)
Dept: OBSTETRICS AND GYNECOLOGY | Facility: OTHER | Age: 43
End: 2023-11-16
Payer: COMMERCIAL

## 2023-11-17 ENCOUNTER — HOSPITAL ENCOUNTER (INPATIENT)
Facility: OTHER | Age: 43
LOS: 2 days | Discharge: HOME | End: 2023-11-19
Attending: OBSTETRICS & GYNECOLOGY | Admitting: OBSTETRICS & GYNECOLOGY
Payer: COMMERCIAL

## 2023-11-17 ENCOUNTER — ANESTHESIA (OUTPATIENT)
Dept: OBSTETRICS AND GYNECOLOGY | Facility: OTHER | Age: 43
End: 2023-11-17
Payer: COMMERCIAL

## 2023-11-17 DIAGNOSIS — O34.219 ENCOUNTER FOR MATERNAL CARE FOR SCAR FROM REPEAT CESAREAN DELIVERY: ICD-10-CM

## 2023-11-17 DIAGNOSIS — Z98.891 STATUS POST C-SECTION: Primary | ICD-10-CM

## 2023-11-17 DIAGNOSIS — O09.523 MULTIGRAVIDA OF ADVANCED MATERNAL AGE IN THIRD TRIMESTER: ICD-10-CM

## 2023-11-17 LAB
ABO + RH BLD: NORMAL
BASOPHILS # BLD AUTO: 0.05 K/UL (ref 0–0.2)
BASOPHILS NFR BLD: 0.6 % (ref 0–1.9)
BLD GP AB SCN CELLS X3 SERPL QL: NORMAL
DIFFERENTIAL METHOD: ABNORMAL
EOSINOPHIL # BLD AUTO: 0.1 K/UL (ref 0–0.5)
EOSINOPHIL NFR BLD: 1.1 % (ref 0–8)
ERYTHROCYTE [DISTWIDTH] IN BLOOD BY AUTOMATED COUNT: 14 % (ref 11.5–14.5)
HCT VFR BLD AUTO: 34.7 % (ref 37–48.5)
HGB BLD-MCNC: 11.4 G/DL (ref 12–16)
IMM GRANULOCYTES # BLD AUTO: 0.12 K/UL (ref 0–0.04)
IMM GRANULOCYTES NFR BLD AUTO: 1.5 % (ref 0–0.5)
LYMPHOCYTES # BLD AUTO: 1.8 K/UL (ref 1–4.8)
LYMPHOCYTES NFR BLD: 22.3 % (ref 18–48)
MCH RBC QN AUTO: 29 PG (ref 27–31)
MCHC RBC AUTO-ENTMCNC: 32.9 G/DL (ref 32–36)
MCV RBC AUTO: 88 FL (ref 82–98)
MONOCYTES # BLD AUTO: 0.7 K/UL (ref 0.3–1)
MONOCYTES NFR BLD: 8.8 % (ref 4–15)
NEUTROPHILS # BLD AUTO: 5.1 K/UL (ref 1.8–7.7)
NEUTROPHILS NFR BLD: 65.7 % (ref 38–73)
NRBC BLD-RTO: 0 /100 WBC
PLATELET # BLD AUTO: 124 K/UL (ref 150–450)
PMV BLD AUTO: ABNORMAL FL (ref 9.2–12.9)
RBC # BLD AUTO: 3.93 M/UL (ref 4–5.4)
SPECIMEN OUTDATE: NORMAL
WBC # BLD AUTO: 7.83 K/UL (ref 3.9–12.7)

## 2023-11-17 PROCEDURE — 51702 INSERT TEMP BLADDER CATH: CPT

## 2023-11-17 PROCEDURE — 36004724 HC OB OR TIME LEV III - 1ST 15 MIN: Performed by: OBSTETRICS & GYNECOLOGY

## 2023-11-17 PROCEDURE — 11000001 HC ACUTE MED/SURG PRIVATE ROOM

## 2023-11-17 PROCEDURE — 63600175 PHARM REV CODE 636 W HCPCS

## 2023-11-17 PROCEDURE — 85025 COMPLETE CBC W/AUTO DIFF WBC: CPT | Performed by: OBSTETRICS & GYNECOLOGY

## 2023-11-17 PROCEDURE — 25000003 PHARM REV CODE 250: Performed by: OBSTETRICS & GYNECOLOGY

## 2023-11-17 PROCEDURE — 27201423 OPTIME MED/SURG SUP & DEVICES STERILE SUPPLY: Performed by: OBSTETRICS & GYNECOLOGY

## 2023-11-17 PROCEDURE — 37000008 HC ANESTHESIA 1ST 15 MINUTES: Performed by: OBSTETRICS & GYNECOLOGY

## 2023-11-17 PROCEDURE — 86900 BLOOD TYPING SEROLOGIC ABO: CPT | Performed by: OBSTETRICS & GYNECOLOGY

## 2023-11-17 PROCEDURE — 63600175 PHARM REV CODE 636 W HCPCS: Performed by: OBSTETRICS & GYNECOLOGY

## 2023-11-17 PROCEDURE — 59514 CESAREAN DELIVERY ONLY: CPT | Mod: AA,P2,GC, | Performed by: ANESTHESIOLOGY

## 2023-11-17 PROCEDURE — 37000009 HC ANESTHESIA EA ADD 15 MINS: Performed by: OBSTETRICS & GYNECOLOGY

## 2023-11-17 PROCEDURE — 25000003 PHARM REV CODE 250

## 2023-11-17 PROCEDURE — 36004725 HC OB OR TIME LEV III - EA ADD 15 MIN: Performed by: OBSTETRICS & GYNECOLOGY

## 2023-11-17 PROCEDURE — 59514 PRA REAN DELIVERY ONLY: ICD-10-PCS | Mod: AA,P2,GC, | Performed by: ANESTHESIOLOGY

## 2023-11-17 PROCEDURE — 71000039 HC RECOVERY, EACH ADD'L HOUR: Performed by: OBSTETRICS & GYNECOLOGY

## 2023-11-17 PROCEDURE — 71000033 HC RECOVERY, INTIAL HOUR: Performed by: OBSTETRICS & GYNECOLOGY

## 2023-11-17 PROCEDURE — 59510 PR FULL ROUT OBSTE CARE,CESAREAN DELIV: ICD-10-PCS | Mod: ,,, | Performed by: OBSTETRICS & GYNECOLOGY

## 2023-11-17 PROCEDURE — 27201108 HC SURGICEL

## 2023-11-17 PROCEDURE — 59510 CESAREAN DELIVERY: CPT | Mod: ,,, | Performed by: OBSTETRICS & GYNECOLOGY

## 2023-11-17 RX ORDER — MISOPROSTOL 200 UG/1
800 TABLET ORAL ONCE AS NEEDED
Status: DISCONTINUED | OUTPATIENT
Start: 2023-11-17 | End: 2023-11-19 | Stop reason: HOSPADM

## 2023-11-17 RX ORDER — ACETAMINOPHEN 500 MG
1000 TABLET ORAL
Status: COMPLETED | OUTPATIENT
Start: 2023-11-17 | End: 2023-11-17

## 2023-11-17 RX ORDER — PRENATAL WITH FERROUS FUM AND FOLIC ACID 3080; 920; 120; 400; 22; 1.84; 3; 20; 10; 1; 12; 200; 27; 25; 2 [IU]/1; [IU]/1; MG/1; [IU]/1; MG/1; MG/1; MG/1; MG/1; MG/1; MG/1; UG/1; MG/1; MG/1; MG/1; MG/1
1 TABLET ORAL DAILY
Status: DISCONTINUED | OUTPATIENT
Start: 2023-11-17 | End: 2023-11-19 | Stop reason: HOSPADM

## 2023-11-17 RX ORDER — KETOROLAC TROMETHAMINE 30 MG/ML
30 INJECTION, SOLUTION INTRAMUSCULAR; INTRAVENOUS
Status: COMPLETED | OUTPATIENT
Start: 2023-11-17 | End: 2023-11-18

## 2023-11-17 RX ORDER — MORPHINE SULFATE 0.5 MG/ML
INJECTION, SOLUTION EPIDURAL; INTRATHECAL; INTRAVENOUS
Status: DISCONTINUED | OUTPATIENT
Start: 2023-11-17 | End: 2023-11-17

## 2023-11-17 RX ORDER — FAMOTIDINE 10 MG/ML
20 INJECTION INTRAVENOUS
Status: COMPLETED | OUTPATIENT
Start: 2023-11-17 | End: 2023-11-17

## 2023-11-17 RX ORDER — ONDANSETRON 2 MG/ML
4 INJECTION INTRAMUSCULAR; INTRAVENOUS EVERY 6 HOURS PRN
Status: DISCONTINUED | OUTPATIENT
Start: 2023-11-17 | End: 2023-11-19 | Stop reason: HOSPADM

## 2023-11-17 RX ORDER — TRANEXAMIC ACID 10 MG/ML
1000 INJECTION, SOLUTION INTRAVENOUS EVERY 30 MIN PRN
Status: DISCONTINUED | OUTPATIENT
Start: 2023-11-17 | End: 2023-11-19 | Stop reason: HOSPADM

## 2023-11-17 RX ORDER — OXYCODONE HYDROCHLORIDE 5 MG/1
5 TABLET ORAL EVERY 4 HOURS PRN
Status: DISCONTINUED | OUTPATIENT
Start: 2023-11-17 | End: 2023-11-19 | Stop reason: HOSPADM

## 2023-11-17 RX ORDER — METHYLERGONOVINE MALEATE 0.2 MG/ML
200 INJECTION INTRAVENOUS ONCE AS NEEDED
Status: DISCONTINUED | OUTPATIENT
Start: 2023-11-17 | End: 2023-11-19 | Stop reason: HOSPADM

## 2023-11-17 RX ORDER — SODIUM CHLORIDE, SODIUM LACTATE, POTASSIUM CHLORIDE, CALCIUM CHLORIDE 600; 310; 30; 20 MG/100ML; MG/100ML; MG/100ML; MG/100ML
INJECTION, SOLUTION INTRAVENOUS CONTINUOUS PRN
Status: DISCONTINUED | OUTPATIENT
Start: 2023-11-17 | End: 2023-11-17

## 2023-11-17 RX ORDER — SODIUM CHLORIDE, SODIUM LACTATE, POTASSIUM CHLORIDE, CALCIUM CHLORIDE 600; 310; 30; 20 MG/100ML; MG/100ML; MG/100ML; MG/100ML
INJECTION, SOLUTION INTRAVENOUS CONTINUOUS
Status: DISCONTINUED | OUTPATIENT
Start: 2023-11-17 | End: 2023-11-17

## 2023-11-17 RX ORDER — OXYCODONE HYDROCHLORIDE 10 MG/1
10 TABLET ORAL EVERY 4 HOURS PRN
Status: DISCONTINUED | OUTPATIENT
Start: 2023-11-17 | End: 2023-11-19 | Stop reason: HOSPADM

## 2023-11-17 RX ORDER — DEXAMETHASONE SODIUM PHOSPHATE 4 MG/ML
INJECTION, SOLUTION INTRA-ARTICULAR; INTRALESIONAL; INTRAMUSCULAR; INTRAVENOUS; SOFT TISSUE
Status: DISCONTINUED | OUTPATIENT
Start: 2023-11-17 | End: 2023-11-17

## 2023-11-17 RX ORDER — FENTANYL CITRATE 50 UG/ML
INJECTION, SOLUTION INTRAMUSCULAR; INTRAVENOUS
Status: DISCONTINUED | OUTPATIENT
Start: 2023-11-17 | End: 2023-11-17

## 2023-11-17 RX ORDER — SODIUM CITRATE AND CITRIC ACID MONOHYDRATE 334; 500 MG/5ML; MG/5ML
30 SOLUTION ORAL
Status: COMPLETED | OUTPATIENT
Start: 2023-11-17 | End: 2023-11-17

## 2023-11-17 RX ORDER — IBUPROFEN 400 MG/1
800 TABLET ORAL
Status: DISCONTINUED | OUTPATIENT
Start: 2023-11-18 | End: 2023-11-19 | Stop reason: HOSPADM

## 2023-11-17 RX ORDER — ENOXAPARIN SODIUM 100 MG/ML
40 INJECTION SUBCUTANEOUS EVERY 24 HOURS
Status: DISCONTINUED | OUTPATIENT
Start: 2023-11-18 | End: 2023-11-19 | Stop reason: HOSPADM

## 2023-11-17 RX ORDER — SIMETHICONE 80 MG
1 TABLET,CHEWABLE ORAL EVERY 6 HOURS PRN
Status: DISCONTINUED | OUTPATIENT
Start: 2023-11-17 | End: 2023-11-19 | Stop reason: HOSPADM

## 2023-11-17 RX ORDER — AMOXICILLIN 250 MG
1 CAPSULE ORAL NIGHTLY PRN
Status: DISCONTINUED | OUTPATIENT
Start: 2023-11-17 | End: 2023-11-19 | Stop reason: HOSPADM

## 2023-11-17 RX ORDER — BUPIVACAINE HYDROCHLORIDE 7.5 MG/ML
INJECTION, SOLUTION INTRASPINAL
Status: DISCONTINUED | OUTPATIENT
Start: 2023-11-17 | End: 2023-11-17

## 2023-11-17 RX ORDER — SODIUM CHLORIDE, SODIUM LACTATE, POTASSIUM CHLORIDE, CALCIUM CHLORIDE 600; 310; 30; 20 MG/100ML; MG/100ML; MG/100ML; MG/100ML
INJECTION, SOLUTION INTRAVENOUS CONTINUOUS
Status: DISCONTINUED | OUTPATIENT
Start: 2023-11-17 | End: 2023-11-19 | Stop reason: HOSPADM

## 2023-11-17 RX ORDER — ACETAMINOPHEN 325 MG/1
650 TABLET ORAL
Status: DISCONTINUED | OUTPATIENT
Start: 2023-11-17 | End: 2023-11-19 | Stop reason: HOSPADM

## 2023-11-17 RX ORDER — DIPHENHYDRAMINE HCL 25 MG
25 CAPSULE ORAL EVERY 6 HOURS PRN
Status: DISCONTINUED | OUTPATIENT
Start: 2023-11-17 | End: 2023-11-19 | Stop reason: HOSPADM

## 2023-11-17 RX ORDER — ONDANSETRON 2 MG/ML
INJECTION INTRAMUSCULAR; INTRAVENOUS
Status: DISCONTINUED | OUTPATIENT
Start: 2023-11-17 | End: 2023-11-17

## 2023-11-17 RX ORDER — NALBUPHINE HYDROCHLORIDE 10 MG/ML
5 INJECTION, SOLUTION INTRAMUSCULAR; INTRAVENOUS; SUBCUTANEOUS ONCE AS NEEDED
Status: DISCONTINUED | OUTPATIENT
Start: 2023-11-17 | End: 2023-11-19 | Stop reason: HOSPADM

## 2023-11-17 RX ORDER — DOCUSATE SODIUM 100 MG/1
200 CAPSULE, LIQUID FILLED ORAL 2 TIMES DAILY
Status: DISCONTINUED | OUTPATIENT
Start: 2023-11-17 | End: 2023-11-19 | Stop reason: HOSPADM

## 2023-11-17 RX ORDER — CARBOPROST TROMETHAMINE 250 UG/ML
250 INJECTION, SOLUTION INTRAMUSCULAR
Status: DISCONTINUED | OUTPATIENT
Start: 2023-11-17 | End: 2023-11-19 | Stop reason: HOSPADM

## 2023-11-17 RX ORDER — PROCHLORPERAZINE EDISYLATE 5 MG/ML
5 INJECTION INTRAMUSCULAR; INTRAVENOUS EVERY 6 HOURS PRN
Status: DISCONTINUED | OUTPATIENT
Start: 2023-11-17 | End: 2023-11-19 | Stop reason: HOSPADM

## 2023-11-17 RX ORDER — DEXMEDETOMIDINE HYDROCHLORIDE 100 UG/ML
INJECTION, SOLUTION INTRAVENOUS
Status: DISCONTINUED | OUTPATIENT
Start: 2023-11-17 | End: 2023-11-17

## 2023-11-17 RX ORDER — ONDANSETRON 8 MG/1
8 TABLET, ORALLY DISINTEGRATING ORAL EVERY 8 HOURS PRN
Status: DISCONTINUED | OUTPATIENT
Start: 2023-11-17 | End: 2023-11-19 | Stop reason: HOSPADM

## 2023-11-17 RX ORDER — DIPHENHYDRAMINE HYDROCHLORIDE 50 MG/ML
12.5 INJECTION INTRAMUSCULAR; INTRAVENOUS
Status: DISCONTINUED | OUTPATIENT
Start: 2023-11-17 | End: 2023-11-19 | Stop reason: HOSPADM

## 2023-11-17 RX ORDER — OXYTOCIN/RINGER'S LACTATE 30/500 ML
95 PLASTIC BAG, INJECTION (ML) INTRAVENOUS CONTINUOUS
Status: DISCONTINUED | OUTPATIENT
Start: 2023-11-17 | End: 2023-11-17

## 2023-11-17 RX ORDER — HYDROCORTISONE 25 MG/G
CREAM TOPICAL 3 TIMES DAILY PRN
Status: DISCONTINUED | OUTPATIENT
Start: 2023-11-17 | End: 2023-11-19 | Stop reason: HOSPADM

## 2023-11-17 RX ADMIN — Medication 95 MILLI-UNITS/MIN: at 08:11

## 2023-11-17 RX ADMIN — DEXTROSE 2 G: 50 INJECTION, SOLUTION INTRAVENOUS at 07:11

## 2023-11-17 RX ADMIN — FAMOTIDINE 20 MG: 10 INJECTION, SOLUTION INTRAVENOUS at 06:11

## 2023-11-17 RX ADMIN — ACETAMINOPHEN 650 MG: 325 TABLET, FILM COATED ORAL at 12:11

## 2023-11-17 RX ADMIN — ACETAMINOPHEN 650 MG: 325 TABLET, FILM COATED ORAL at 06:11

## 2023-11-17 RX ADMIN — BUPIVACAINE HYDROCHLORIDE IN DEXTROSE 1.6 ML: 7.5 INJECTION, SOLUTION SUBARACHNOID at 07:11

## 2023-11-17 RX ADMIN — ACETAMINOPHEN 1000 MG: 500 TABLET ORAL at 06:11

## 2023-11-17 RX ADMIN — DEXMEDETOMIDINE 8 MCG: 200 INJECTION, SOLUTION INTRAVENOUS at 07:11

## 2023-11-17 RX ADMIN — Medication 6 UNITS: at 08:11

## 2023-11-17 RX ADMIN — DOCUSATE SODIUM 200 MG: 100 CAPSULE, LIQUID FILLED ORAL at 08:11

## 2023-11-17 RX ADMIN — SODIUM CHLORIDE, SODIUM LACTATE, POTASSIUM CHLORIDE, AND CALCIUM CHLORIDE: 600; 310; 30; 20 INJECTION, SOLUTION INTRAVENOUS at 06:11

## 2023-11-17 RX ADMIN — PHENYLEPHRINE HYDROCHLORIDE 0.5 MCG/KG/MIN: 10 INJECTION INTRAVENOUS at 07:11

## 2023-11-17 RX ADMIN — Medication 6 UNITS: at 07:11

## 2023-11-17 RX ADMIN — KETOROLAC TROMETHAMINE 30 MG: 30 INJECTION, SOLUTION INTRAMUSCULAR at 07:11

## 2023-11-17 RX ADMIN — ONDANSETRON HYDROCHLORIDE 4 MG: 2 INJECTION INTRAMUSCULAR; INTRAVENOUS at 07:11

## 2023-11-17 RX ADMIN — KETOROLAC TROMETHAMINE 30 MG: 30 INJECTION, SOLUTION INTRAMUSCULAR at 12:11

## 2023-11-17 RX ADMIN — KETOROLAC TROMETHAMINE 30 MG: 30 INJECTION, SOLUTION INTRAMUSCULAR at 06:11

## 2023-11-17 RX ADMIN — Medication 0.1 MG: at 07:11

## 2023-11-17 RX ADMIN — SODIUM CITRATE AND CITRIC ACID MONOHYDRATE 30 ML: 500; 334 SOLUTION ORAL at 06:11

## 2023-11-17 RX ADMIN — SODIUM CHLORIDE, POTASSIUM CHLORIDE, SODIUM LACTATE AND CALCIUM CHLORIDE: 600; 310; 30; 20 INJECTION, SOLUTION INTRAVENOUS at 06:11

## 2023-11-17 RX ADMIN — FENTANYL CITRATE 10 MCG: 50 INJECTION, SOLUTION INTRAMUSCULAR; INTRAVENOUS at 07:11

## 2023-11-17 RX ADMIN — DEXAMETHASONE SODIUM PHOSPHATE 4 MG: 4 INJECTION, SOLUTION INTRAMUSCULAR; INTRAVENOUS at 07:11

## 2023-11-17 RX ADMIN — SODIUM CHLORIDE, SODIUM LACTATE, POTASSIUM CHLORIDE, AND CALCIUM CHLORIDE: 600; 310; 30; 20 INJECTION, SOLUTION INTRAVENOUS at 07:11

## 2023-11-17 NOTE — ANESTHESIA PROCEDURE NOTES
CSE    Patient location during procedure: OR  Start time: 11/17/2023 7:06 AM  Timeout: 11/17/2023 7:05 AM  End time: 11/17/2023 7:09 AM    Reason for block: labor analgesia requested by patient and obstetrician    Staffing  Authorizing Provider: Elliott Hernandez MD  Performing Provider: Elliott Hernandez MD    Staffing  Performed by: Elliott Hernandez MD  Authorized by: Elliott Hernandez MD    Preanesthetic Checklist  Completed: patient identified, IV checked, site marked, risks and benefits discussed, surgical consent, monitors and equipment checked, pre-op evaluation and timeout performed  CSE  Patient position: sitting  Prep: ChloraPrep  Patient monitoring: continuous pulse ox and frequent blood pressure checks  Approach: midline  Spinal Needle  Needle type: pencil-tip   Needle gauge: 25 G  Needle length: 3.5 in  Epidural Needle  Injection technique: CARMELLA saline  Needle type: Tuohy   Needle gauge: 17 G  Needle length: 3.5 in  Needle insertion depth: 4 cm  Location: L4-5  Needle localization: anatomical landmarks   Catheter  Catheter type: Libretto  Catheter size: 19 G  Catheter at skin depth: 8 cm  Additional Documentation: incremental injection  Assessment  Sensory level: T5

## 2023-11-17 NOTE — TRANSFER OF CARE
"Anesthesia Transfer of Care Note    Patient: Nickie Clemente    Procedure(s) Performed: Procedure(s) (LRB):   SECTION (N/A)    Patient location: Labor and Delivery    Anesthesia Type: CSE    Transport from OR: Transported from OR on 6-10 L/min O2 by face mask with adequate spontaneous ventilation    Post pain: adequate analgesia    Post assessment: no apparent anesthetic complications and tolerated procedure well    Post vital signs: stable    Level of consciousness: awake, alert and oriented    Nausea/Vomiting: no nausea/vomiting    Complications: none    Transfer of care protocol was followed      Last vitals: Visit Vitals  /71   Pulse 65   Temp 36.7 °C (98 °F)   Resp 18   Ht 5' 2" (1.575 m)   Wt 75.3 kg (166 lb)   LMP 2023   SpO2 99%   Breastfeeding No   BMI 30.36 kg/m²     "

## 2023-11-17 NOTE — H&P
HISTORY AND PHYSICAL                                                OBSTETRICS          Subjective:       Nickie Clemente is a 43 y.o.  female with IUP at 39w0d weeks gestation who presents for planned repeat .    Patient reports contractions, denies vaginal bleeding, denies LOF.   Fetal Movement: normal.    This IUP is complicated by AMA, H/o Cdx2, GBS+.    Review of Systems   Constitutional:  Negative for chills, fatigue and fever.   Eyes:  Negative for visual disturbance.   Respiratory:  Negative for cough, shortness of breath and wheezing.    Cardiovascular:  Negative for chest pain.   Gastrointestinal:  Negative for abdominal pain, nausea and vomiting.   Genitourinary:  Positive for pelvic pain. Negative for dysuria and vaginal bleeding.   Musculoskeletal:  Negative for back pain.   Integumentary:  Negative for rash.   Neurological:  Negative for syncope.   Hematological:  Negative for adenopathy.   Psychiatric/Behavioral:  Negative for depression.        PMHx: No past medical history on file.    PSHx:   Past Surgical History:   Procedure Laterality Date     SECTION      x2    LUMPECTOMY, BREAST Right        All: Review of patient's allergies indicates:  No Known Allergies    Meds:   Medications Prior to Admission   Medication Sig Dispense Refill Last Dose    prenatal vit37/iron/folic acid (PRENATA ORAL) Take by mouth.   2023       SH:   Social History     Socioeconomic History    Marital status:    Tobacco Use    Smoking status: Never    Smokeless tobacco: Never   Substance and Sexual Activity    Alcohol use: No    Drug use: Never    Sexual activity: Yes     Partners: Male     Social Determinants of Health     Financial Resource Strain: Low Risk  (2023)    Overall Financial Resource Strain (CARDIA)     Difficulty of Paying Living Expenses: Not hard at all   Food Insecurity: No Food Insecurity (2023)    Hunger Vital Sign     Worried About Running Out of  "Food in the Last Year: Never true     Ran Out of Food in the Last Year: Never true   Transportation Needs: No Transportation Needs (2023)    PRAPARE - Transportation     Lack of Transportation (Medical): No     Lack of Transportation (Non-Medical): No   Stress: No Stress Concern Present (2023)    [facility] [place] of [employer]ational [employer] - Occupational Stress Questionnaire     Feeling of Stress : Only a little   Housing Stability: Low Risk  (2023)    Housing Stability Vital Sign     Unable to Pay for Housing in the Last Year: No     Number of Places Lived in the Last Year: 1     Unstable Housing in the Last Year: No       FH:   Family History   Problem Relation Age of Onset    Diabetes Mother     Heart disease Mother     Glaucoma Father     Breast cancer Neg Hx     Ovarian cancer Neg Hx     Colon cancer Neg Hx        OBHx:   OB History    Para Term  AB Living   4 3 3 0 0 3   SAB IAB Ectopic Multiple Live Births   0 0 0 0 3      # Outcome Date GA Lbr Ector/2nd Weight Sex Delivery Anes PTL Lv   4 Current            3 Term 02/20/10   3.544 kg (7 lb 13 oz) F       2 Term 05   3.856 kg (8 lb 8 oz) M CS-Classical      1 Term 04   3.345 kg (7 lb 6 oz) F          Obstetric Comments   /R/6   No abnormal pap   No STDs    x 1, CD x 2       Objective:       /71   Pulse 65   Temp 98 °F (36.7 °C)   Resp 18   Ht 5' 2" (1.575 m)   Wt 75.3 kg (166 lb)   LMP 2023   SpO2 99%   Breastfeeding No   BMI 30.36 kg/m²     Vitals:    23 0533 23 0534 23 0608 23 0610   BP:  132/71  128/71   Pulse: 67 67 62 65   Resp:  18     Temp:  98 °F (36.7 °C)     SpO2: 98%  99%    Weight:  75.3 kg (166 lb)     Height:  5' 2" (1.575 m)         General:   alert, appears stated age and cooperative, no apparent distress   HENT:  normocephalic, atraumatic   Eyes:  extraocular movements and conjunctivae normal   Neck:  supple, range of motion normal, no thyromegaly "   Lungs:   no respiratory distress   Heart:   regular rate   Abdomen:  soft, non-tender, non-distended but gravid, no rebound or guarding    Extremities negative edema, negative erythema   FHT: Baseline 125, moderate BTBV, +accels, -decels;  Cat 1 (reassuring)                 TOCO: Q 4-5 minutes   Presentations: cephalic by ultrasound   Cervix: Deferred                             Sterile Speculum Exam: N/A    EFW by Leopold's: 7#    Recent Growth Scan: 2699g 44%, AC 89%      Lab Review  Blood Type O POS  GBBS: negative  Rubella: Immune  RPR: Neg  HIV: negative  HepB: negative       Assessment:       39w0d weeks gestation here for planned rLTCS    There are no hospital problems to display for this patient.         Plan:   rLTCS    Risks, benefits, alternatives and possible complications have been discussed in detail with the patient.   - Consents signed and to chart  - Admit to Labor and Delivery unit  - H/o Cdx2, including h/o classical CS   - Epidural per Anesthesia  - Draw CBC, T&S  - Notify Staff  - EFW 2699g 44% @34w5d    AMA  - Lovenox indicated given >40 age + c/s      GBS+   - PNC not indicated given c/section  - Ancef on call to OR    Post-Partum Hemorrhage risk - medium    Elva Lake MD PGY-2  Obstetrics and Gynecology  [facility]

## 2023-11-17 NOTE — BRIEF OP NOTE
[facility] - [facility] Delivery  Brief Operative Note    SUMMARY     Surgery Date: 2023     Surgeon(s) and Role:     * Dory Leroy MD - Primary     * Elva Lake MD - Resident - Assisting    Pre-op Diagnosis:  History of  delivery, currently pregnant [O34.219]    Post-op Diagnosis:  Post-Op Diagnosis Codes:     * History of  delivery, currently pregnant [O34.219]    Procedure(s) (LRB):   SECTION (N/A)    Anesthesia: CSE    Operative Findings:   Viable male infant weighing 3580 grams with APGARS 9/9.   3cm uterine window that require figure of 8 suture to close. Patient is not a candidate for TOLAC and could be offered delivery earlier than 39 weeks given thin lower uterine segment.   Hysterotomy closed in two layers with 0-chromic. Small areas of bleeding around hysterotomy ligated with figure of 8 sutures and bovie cautery. Surgicell powder placed on hysterotomy for additional hemostasis.   Excellent hemostasis following closure of each layer.     Estimated Blood Loss: 795cc    Specimens:   Specimen (24h ago, onward)      None            [id number]    Elva Lake MD PGY-2  Obstetrics and Gynecology  [facility]

## 2023-11-17 NOTE — L&D DELIVERY NOTE
[facility] - [facility] Delivery   Section   Operative Note    SUMMARY      Section Operative Note    Procedure Date: 2023    Procedure: Repeat Low Transverse  Section via pfannenstiel skin incision    Indications:   IUP at 39w0d w/ h/o of CSx2    Pre-operative Diagnosis:   1. IUP at 39 week 0 day pregnancy  2. AMA  3. H/o Csx2  4. GBS+     Post-operative Diagnosis:   5. S/p rLTCS    Surgeon: Dory Leroy MD     Assistants: Elva Lake MD - Resident    Anesthesia: CSE    Findings:   Viable male infant weighing 3580 grams with APGARS 9/9.   3cm uterine window that require figure of 8 suture to close. Patient is not a candidate for TOLAC and could be offered delivery earlier than 39 weeks given thin lower uterine segment.   Hysterotomy closed in two layers with 1-chromic. Small areas of bleeding around hysterotomy ligated with figure of 8 sutures and bovie cautery. Surgicell powder placed on hysterotomy for additional hemostasis.   Excellent hemostasis following closure of each layer.    Estimated Blood Loss:  795 mL           Total IV Fluids: per anesthesia     UOP: per anesthesia     Specimens:   1. Single viable male infant   2. Placenta which was discarded    PreOp CBC:   Lab Results   Component Value Date    WBC 11.95 2023    HGB 9.3 (L) 2023    HCT 28.5 (L) 2023    MCV 90 2023     (L) 2023                     Complications:  Uterine window as noted above in findings           Disposition: PACU - hemodynamically stable.           Condition: stable    Procedure Details   The patient was seen in the Holding Room. The risks, benefits, complications, treatment options, and expected outcomes were discussed with the patient.  The patient concurred with the proposed plan, giving informed consent.  The site of surgery properly noted/marked. The patient was taken to Operating Room, identified as Nickie Clemente and the procedure verified as  repeat Low Transverse  Delivery. A Time Out was held and the above information confirmed.    After induction of anesthesia, the patient was prepped and draped in the usual sterile manner while placed in a dorsal supine position with a left lateral tilt.  A mckeon catheter was also placed per nursing. Preoperative antibiotics (2g ancef) were administered and an allis test was performed yielding adequate anesthesia.  A Pfannenstiel incision was made and carried down through the subcutaneous tissue to the fascia. Fascial incision was made and extended transversely. The fascia was grasped with Kocher clamps and  from the underlying rectus tissue superiorly and inferiorly. Minimal adhesive disease from prior c-sections noted. The peritoneum was identified, found to be free of adherent bowel and entered bluntly. Peritoneal incision was extended longitudinally. The vesico-uterine peritoneum was identified and bladder blade was inserted. A low transverse uterine incision was made with knife and extended with cephalo-caudal traction . The amniotic sac was ruptured bluntly and the infant was noted to be in cephalic position. The fetal head  was brought to the incision and elevated out of the pelvis. The patient delivered a single viable male infant. Infant weighed 3580 grams with Apgar scores of 9/9 at one and five minutes respectively. After the umbilical cord was clamped and cut, cord blood was obtained for evaluation. The placenta was removed intact and appeared normal, and was discarded. The uterus was exteriorized. The uterine outline, tubes and ovaries appeared normal. The uterine incision was closed with running locked sutures of 1-chromic in two layers. 3cm uterine window was noted on infertior aspect of hysterotomy. Window closed in single figure of 8 sutured of 1-chromic. Small areas of bleeding around hysterotomy ligated with figure of 8 sutures and bovie cautery. Hemostasis was observed. The  "uterus was returned to the abdominal cavity. Incision was reinspected and good hemostasis was noted. Surgicell powder placed on hysterotomy for additional hemostasis. The abdominal cavity was mopped with lap sponge to remove clot and debris. Muscle and fascia inspected and small areas of bleeding cauterized with bovie cautery.  The fascia was then reapproximated with running sutures of 1 vicryl. The subcutaneous fat and skin was reapproximated with 2-0 Vicryl and 4-0 monocryl respectively.    Instrument, sponge, and needle counts were correct prior the abdominal closure and at the conclusion of the case.     Pt tolerated procedure well and was in good condition after the procedure.    **NOTE: This patient IS NOT a candidate for trial of labor after  delivery.**    Specimens:   Specimen (24h ago, onward)      None            Condition: Good    VTE Risk Mitigation (From admission, onward)      None            Disposition: PACU - hemodynamically stable.    Attestation: Good         Delivery Information for Jacques Clemente    Birth information:  YOB: 2024   Time of birth: 7:33 AM   Sex: male   Head Delivery Date/Time: 2023  7:33 AM   Delivery type: , Low Transverse   Gestational Age: 39w0d        Delivery Providers    Delivering clinician: Dory Leroy MD   Provider Role    Elva Lake MD Duhe, Emily P., BEN Molina, BEN De Oliveira Macy L.               Measurements    Weight: 3580 g  Weight (lbs): 7 lb 14.3 oz  Length: 50.8 cm  Length (in): 20"  Head circumference: 34.9 cm  Chest circumference: 34.3 cm         Apgars    Living status: Living  Apgar Component Scores:  1 min.:  5 min.:  10 min.:  15 min.:  20 min.:    Skin color:  1  1       Heart rate:  2  2       Reflex irritability:  2  2       Muscle tone:  2  2       Respiratory effort:  2  2       Total:  9  9       Apgars assigned by: COLLIN MOLINA         Operative Delivery    Forceps attempted?: " No  Vacuum extractor attempted?: No         Shoulder Dystocia    Shoulder dystocia present?: No           Presentation    Presentation: Vertex  Position: Left Occiput Anterior           Interventions/Resuscitation    Method: Bulb Suctioning, Tactile Stimulation       Cord    Vessels: 3 vessels  Complications: None  Delayed Cord Clamping?: Yes  Cord Clamped Date/Time: 2023  7:34 AM  Cord Blood Disposition: Sent with Baby  Gases Sent?: No  Stem Cell Collection (by MD): No       Placenta    Placenta delivery date/time: 2023 0735  Placenta removal: Manual removal  Placenta appearance: Intact  Placenta disposition: Discarded           Labor Events:       labor: No     Labor Onset Date/Time:         Dilation Complete Date/Time:         Start Pushing Date/Time:         Start Pushing Date/Time:       Rupture Date/Time: 23  0733         Rupture type: ARM (Artificial Rupture)         Fluid Amount:       Fluid Color: Clear               steroids:       Antibiotics given for GBS:       Induction:       Indications for induction:        Augmentation:       Indications for augmentation:       Labor complications:       Additional complications:          Cervical ripening:                     Delivery:      Episiotomy:       Indication for Episiotomy:       Perineal Lacerations:   Repaired:      Periurethral Laceration:   Repaired:     Labial Laceration:   Repaired:     Sulcus Laceration:   Repaired:     Vaginal Laceration:   Repaired:     Cervical Laceration:   Repaired:     Repair suture:       Repair # of packets:       Last Value - EBL - Nursing (mL):       Sum - EBL - Nursing (mL): 0     Last Value - EBL - Anesthesia (mL):      Calculated QBL (mL): 795      Vaginal Sweep Performed:       Surgicount Correct:       Vaginal Packing:   Quantity:       Other providers:       Anesthesia    Method: Spinal          Details (if applicable):  Trial of Labor No    Categorization:  Repeat    Priority: Routine   Indications for : Repeat Section   Incision Type: low transverse     Additional  information:  Forceps:    Vacuum:    Breech:    Observed anomalies    Other (Comments):           Addendum: Correction of procedure and delivery date to 2023.          Dory Leroy MD  [facility]

## 2023-11-17 NOTE — ANESTHESIA PREPROCEDURE EVALUATION
[facility]  Obstetric Anesthesia Pre-Procedure Evaluation         Patient Name: Nickie Clemente  YOB: 1980  MRN: [id number]    2023    SUBJECTIVE:     Nickie Clemente is a 43 y.o. female  at 39w0d who presents fro Scheduled .     Previous pregnancy was complicated by     She reports previous neuraxial anesthesia. She had a spinal for her previous C/S.  She endorses issues with this neuraxial - traumatic epidural placement.     She denies history of HTN, asthma, bleeding or coagulation disorders, spine abnormalities, or previous back surgeries.      OB History    Para Term  AB Living   4 3 3     3   SAB IAB Ectopic Multiple Live Births           3      # Outcome Date GA Lbr Ector/2nd Weight Sex Delivery Anes PTL Lv   4 Current            3 Term 02/20/10   3.544 kg (7 lb 13 oz) F       2 Term 05   3.856 kg (8 lb 8 oz) M CS-Classical      1 Term 04   3.345 kg (7 lb 6 oz) F          Obstetric Comments   /R/   No abnormal pap   No STDs    x 1, CD x 2       Review of patient's allergies indicates:  No Known Allergies    Patient Active Problem List   Diagnosis    Acute viral pharyngitis    Cough    Fever blister    Amenorrhea    GBS (group B Streptococcus carrier), +RV culture, currently pregnant    History of  delivery, currently pregnant    Multigravida of advanced maternal age in third trimester       Past Surgical History:   Procedure Laterality Date     SECTION      x2    LUMPECTOMY, BREAST Right        Tobacco Use: Low Risk  (2023)    Patient History     Smoking Tobacco Use: Never     Smokeless Tobacco Use: Never     Passive Exposure: Not on file     Alcohol Use: Not on file     Social History     Substance and Sexual Activity   Drug Use Never       OBJECTIVE:     Vital Signs:  Temp:  [36.7 °C (98 °F)]   Pulse:  [67]   Resp:  [18]   BP: (132)/(71)   SpO2:  [98 %]       Wt Readings from Last 1  "Encounters:   11/17/23 0534 75.3 kg (166 lb)       BP Readings from Last 3 Encounters:   11/17/23 132/71   11/08/23 120/78   10/31/23 124/82       Significant Labs    Heme Profile  Lab Results   Component Value Date    WBC 7.83 11/17/2023    HGB 11.4 (L) 11/17/2023    HCT 34.7 (L) 11/17/2023     (L) 11/17/2023       Coagulation Studies  No results found for: "LABPROT", "INR", "APTT"    BMP  Lab Results   Component Value Date     05/09/2021    K 3.8 05/09/2021     05/09/2021    CO2 20 (L) 05/09/2021    BUN 16 05/09/2021    CREATININE 0.77 05/09/2021       Liver Function Tests  Lab Results   Component Value Date    AST 28 05/09/2021    ALT 21 05/09/2021    ALKPHOS 71 05/09/2021    BILITOT 1.4 (H) 05/09/2021    PROT 8.9 (H) 05/09/2021    ALBUMIN 4.8 05/09/2021       Endocrine Profile  Lab Results   Component Value Date    TSH 2.575 05/11/2023       Cardiac Studies    EKG:   Results for orders placed or performed during the hospital encounter of 06/14/23   EKG 12-lead    Collection Time: 06/14/23  1:39 AM    Narrative    Test Reason : M79.602,    Vent. Rate : 080 BPM     Atrial Rate : 080 BPM     P-R Int : 144 ms          QRS Dur : 080 ms      QT Int : 372 ms       P-R-T Axes : 043 070 055 degrees     QTc Int : 429 ms    Normal sinus rhythm  Normal ECG  When compared with ECG of 13-MAY-1997 12:32,  No significant change was found  Confirmed by Chris DUPONT MD, Marco DUNLAP ([id number]) on 6/14/2023 8:47:51 AM    Referred By: MEGAN WORTHY           Confirmed By:Marco Perez III, MD       ASSESSMENT/PLAN:           Pre-op Assessment    I have reviewed the Patient Summary Reports.     I have reviewed the Nursing Notes. I have reviewed the NPO Status.   I have reviewed the Medications.     Review of Systems  Anesthesia Hx:  No problems with previous Anesthesia   History of prior surgery of interest to airway management or planning:          Denies Family Hx of Anesthesia complications.    Denies Personal Hx " of Anesthesia complications.                    Social:  No Alcohol Use, Non-Smoker       Hematology/Oncology:  Hematology Normal   Oncology Normal                                   EENT/Dental:  EENT/Dental Normal           Cardiovascular:  Cardiovascular Normal Exercise tolerance: good                                           Pulmonary:  Pulmonary Normal                       Renal/:  Renal/ Normal                 Hepatic/GI:  Hepatic/GI Normal                 Neurological:  Neurology Normal                                      Endocrine:  Endocrine Normal          Obesity / BMI > 30  Psych:  Psychiatric Normal                    Physical Exam  General: Well nourished, Cooperative, Alert and Oriented    Airway:  Mallampati: II   Mouth Opening: Normal  TM Distance: Normal  Tongue: Normal  Neck ROM: Normal ROM    Dental:  Intact    Chest/Lungs:  Clear to auscultation, Normal Respiratory Rate    Heart:  Rate: Normal  Rhythm: Regular Rhythm        Anesthesia Plan  Type of Anesthesia, risks & benefits discussed:    Anesthesia Type: CSE  Intra-op Monitoring Plan: Standard ASA Monitors  Post Op Pain Control Plan: epidural analgesia  Informed Consent: Informed consent signed with the Patient and all parties understand the risks and agree with anesthesia plan.  All questions answered. Patient consented to blood products? Yes  ASA Score: 2  Day of Surgery Review of History & Physical: H&P Update referred to the surgeon/provider.    Ready For Surgery From Anesthesia Perspective.     .

## 2023-11-18 LAB
BASOPHILS # BLD AUTO: 0.05 K/UL (ref 0–0.2)
BASOPHILS NFR BLD: 0.4 % (ref 0–1.9)
DIFFERENTIAL METHOD: ABNORMAL
EOSINOPHIL # BLD AUTO: 0 K/UL (ref 0–0.5)
EOSINOPHIL NFR BLD: 0.3 % (ref 0–8)
ERYTHROCYTE [DISTWIDTH] IN BLOOD BY AUTOMATED COUNT: 14.1 % (ref 11.5–14.5)
HCT VFR BLD AUTO: 28.5 % (ref 37–48.5)
HGB BLD-MCNC: 9.3 G/DL (ref 12–16)
IMM GRANULOCYTES # BLD AUTO: 0.07 K/UL (ref 0–0.04)
IMM GRANULOCYTES NFR BLD AUTO: 0.6 % (ref 0–0.5)
LYMPHOCYTES # BLD AUTO: 1.8 K/UL (ref 1–4.8)
LYMPHOCYTES NFR BLD: 15.1 % (ref 18–48)
MCH RBC QN AUTO: 29.3 PG (ref 27–31)
MCHC RBC AUTO-ENTMCNC: 32.6 G/DL (ref 32–36)
MCV RBC AUTO: 90 FL (ref 82–98)
MONOCYTES # BLD AUTO: 0.8 K/UL (ref 0.3–1)
MONOCYTES NFR BLD: 6.4 % (ref 4–15)
NEUTROPHILS # BLD AUTO: 9.2 K/UL (ref 1.8–7.7)
NEUTROPHILS NFR BLD: 77.2 % (ref 38–73)
NRBC BLD-RTO: 0 /100 WBC
PLATELET # BLD AUTO: 107 K/UL (ref 150–450)
PMV BLD AUTO: 14 FL (ref 9.2–12.9)
RBC # BLD AUTO: 3.17 M/UL (ref 4–5.4)
WBC # BLD AUTO: 11.95 K/UL (ref 3.9–12.7)

## 2023-11-18 PROCEDURE — 99233 SBSQ HOSP IP/OBS HIGH 50: CPT | Mod: ,,, | Performed by: OBSTETRICS & GYNECOLOGY

## 2023-11-18 PROCEDURE — 99233 PR SUBSEQUENT HOSPITAL CARE,LEVL III: ICD-10-PCS | Mod: ,,, | Performed by: OBSTETRICS & GYNECOLOGY

## 2023-11-18 PROCEDURE — 25000003 PHARM REV CODE 250

## 2023-11-18 PROCEDURE — 85025 COMPLETE CBC W/AUTO DIFF WBC: CPT | Performed by: OBSTETRICS & GYNECOLOGY

## 2023-11-18 PROCEDURE — 36415 COLL VENOUS BLD VENIPUNCTURE: CPT | Performed by: OBSTETRICS & GYNECOLOGY

## 2023-11-18 PROCEDURE — 11000001 HC ACUTE MED/SURG PRIVATE ROOM

## 2023-11-18 PROCEDURE — 63600175 PHARM REV CODE 636 W HCPCS: Performed by: OBSTETRICS & GYNECOLOGY

## 2023-11-18 PROCEDURE — 25000003 PHARM REV CODE 250: Performed by: OBSTETRICS & GYNECOLOGY

## 2023-11-18 PROCEDURE — 63600175 PHARM REV CODE 636 W HCPCS

## 2023-11-18 RX ORDER — IRON POLYSACCHARIDE COMPLEX 150 MG
150 CAPSULE ORAL DAILY
Status: DISCONTINUED | OUTPATIENT
Start: 2023-11-18 | End: 2023-11-19 | Stop reason: HOSPADM

## 2023-11-18 RX ADMIN — ENOXAPARIN SODIUM 40 MG: 40 INJECTION SUBCUTANEOUS at 10:11

## 2023-11-18 RX ADMIN — ACETAMINOPHEN 650 MG: 325 TABLET, FILM COATED ORAL at 07:11

## 2023-11-18 RX ADMIN — ACETAMINOPHEN 650 MG: 325 TABLET, FILM COATED ORAL at 08:11

## 2023-11-18 RX ADMIN — OXYCODONE HYDROCHLORIDE 5 MG: 5 TABLET ORAL at 03:11

## 2023-11-18 RX ADMIN — IBUPROFEN 800 MG: 400 TABLET, FILM COATED ORAL at 07:11

## 2023-11-18 RX ADMIN — SIMETHICONE 80 MG: 80 TABLET, CHEWABLE ORAL at 12:11

## 2023-11-18 RX ADMIN — IBUPROFEN 800 MG: 400 TABLET, FILM COATED ORAL at 10:11

## 2023-11-18 RX ADMIN — DOCUSATE SODIUM 200 MG: 100 CAPSULE, LIQUID FILLED ORAL at 08:11

## 2023-11-18 RX ADMIN — SIMETHICONE 80 MG: 80 TABLET, CHEWABLE ORAL at 03:11

## 2023-11-18 RX ADMIN — KETOROLAC TROMETHAMINE 30 MG: 30 INJECTION, SOLUTION INTRAMUSCULAR at 12:11

## 2023-11-18 RX ADMIN — ACETAMINOPHEN 650 MG: 325 TABLET, FILM COATED ORAL at 01:11

## 2023-11-18 RX ADMIN — IBUPROFEN 800 MG: 400 TABLET, FILM COATED ORAL at 03:11

## 2023-11-18 RX ADMIN — OXYCODONE HYDROCHLORIDE 5 MG: 5 TABLET ORAL at 02:11

## 2023-11-18 RX ADMIN — PRENATAL VIT W/ FE FUMARATE-FA TAB 27-0.8 MG 1 TABLET: 27-0.8 TAB at 08:11

## 2023-11-18 RX ADMIN — SIMETHICONE 80 MG: 80 TABLET, CHEWABLE ORAL at 09:11

## 2023-11-18 RX ADMIN — ACETAMINOPHEN 650 MG: 325 TABLET, FILM COATED ORAL at 12:11

## 2023-11-18 RX ADMIN — POLYSACCHARIDE-IRON COMPLEX 150 MG: 150 CAPSULE ORAL at 08:11

## 2023-11-18 NOTE — PROGRESS NOTES
"POSTPARTUM PROGRESS NOTE    Subjective:     PPD/POD#: 1   Procedure: Repeat LTCS   EGA: 39w0d   N/V: No   F/C: No   Abd Pain: Mild, well-controlled with oral pain medication   Lochia: Mild   Voiding: Yes   Ambulating: Yes   Bowel fnc: No   Contraception: Per primary OB     Objective:      Temp:  [97.5 °F (36.4 °C)-98 °F (36.7 °C)] 97.5 °F (36.4 °C)  Pulse:  [60-86] 68  Resp:  [16-18] 18  SpO2:  [95 %-100 %] 99 %  BP: ()/(53-71) 119/60    Abdomen: Soft, appropriately tender   Uterus: Firm, no fundal tenderness   Incision: Bandage in place without shadowing     Lab Review    No results for input(s): "NA", "K", "CL", "CO2", "BUN", "CREATININE", "GLU", "PROT", "BILITOT", "ALKPHOS", "ALT", "AST", "MG", "PHOS" in the last 168 hours.    Recent Labs   Lab 23  0524   WBC 7.83   HGB 11.4*   HCT 34.7*   MCV 88   *         I/O    Intake/Output Summary (Last 24 hours) at 2023 0503  Last data filed at 2023 2300  Gross per 24 hour   Intake 1604.33 ml   Output 2495 ml   Net -890.67 ml        Assessment and Plan:   Postpartum care:  - Patient doing well.  - Continue routine management and advances.    AMA   - Lovenox 40 qD due to  section and age >40      Theodora Rice MD  [facility]   OBGYN PGY2      "

## 2023-11-18 NOTE — LACTATION NOTE
11/18/23 1120   Maternal Assessment   Breast Shape Right:;pendulous   Breast Density Right:;soft   Areola Right:;elastic;surgical scarring  (R lumpectomy; history decreased supply in that breast with prior children)   Nipples Right:;everted   Maternal Infant Feeding   Maternal Emotional State independent   Infant Positioning clutch/football   Pain with Feeding yes  (mom states baby doesn't open wide, pinched)   Latch Assistance no  (baby asleep now, just fed)   Community Referrals   Community Referrals support group;pediatric care provider;outpatient lactation program  (community resources to address tight frenulum)     Basic lactation education reviewed. Discussed history of low milk supply in right breast with history of lumpectomy. She nursed her other children mostly from left breast. Baby recently fed 12min and asleep now, content.     Mother inquires of baby's tongue frenulum. Discussed LC unable to diagnose but can show lip frenum and tongue lingual frenulum. Upper lip frenum, thick, blanches gum when elevated, but able to elevate to nares and can flange at breast. Lingual frenulum tight, short, elastic, almost to tip of tongue. Mother states she noticed heart shaped tongue when crying. Discussed oral function factors to seek full evaluation and provided community resources. For now, feed baby on cue, call LC for latch assistance to work on deeper latch.

## 2023-11-18 NOTE — ANESTHESIA POSTPROCEDURE EVALUATION
Anesthesia Post Evaluation    Patient: Nickie Clemente    Procedure(s) Performed: Procedure(s) (LRB):   SECTION (N/A)    Final Anesthesia Type: CSE      Patient location during evaluation: med/surg floor  Patient participation: Yes- Able to Participate  Level of consciousness: awake and alert and oriented  Post-procedure vital signs: reviewed and stable  Pain management: adequate  Airway patency: patent    PONV status at discharge: No PONV  Anesthetic complications: no      Cardiovascular status: stable  Respiratory status: unassisted  Hydration status: euvolemic  Follow-up not needed.          Vitals Value Taken Time   /60 23   Temp 36.4 °C (97.5 °F) 23   Pulse 68 23   Resp 18 23   SpO2 99 % 23         Event Time   Out of Recovery 10:50:00         Pain/Kia Score: Pain Rating Prior to Med Admin: 5 (2023  2:02 AM)  Pain Rating Post Med Admin: 2 (2023  3:00 AM)

## 2023-11-19 VITALS
TEMPERATURE: 98 F | HEART RATE: 71 BPM | HEIGHT: 62 IN | RESPIRATION RATE: 18 BRPM | BODY MASS INDEX: 30.55 KG/M2 | DIASTOLIC BLOOD PRESSURE: 66 MMHG | SYSTOLIC BLOOD PRESSURE: 140 MMHG | WEIGHT: 166 LBS | OXYGEN SATURATION: 99 %

## 2023-11-19 PROBLEM — Z98.891 STATUS POST C-SECTION: Status: ACTIVE | Noted: 2023-11-19

## 2023-11-19 PROCEDURE — 25000003 PHARM REV CODE 250

## 2023-11-19 PROCEDURE — 25000003 PHARM REV CODE 250: Performed by: OBSTETRICS & GYNECOLOGY

## 2023-11-19 PROCEDURE — 99233 SBSQ HOSP IP/OBS HIGH 50: CPT | Mod: ,,, | Performed by: OBSTETRICS & GYNECOLOGY

## 2023-11-19 PROCEDURE — 99233 PR SUBSEQUENT HOSPITAL CARE,LEVL III: ICD-10-PCS | Mod: ,,, | Performed by: OBSTETRICS & GYNECOLOGY

## 2023-11-19 RX ORDER — ACETAMINOPHEN 325 MG/1
650 TABLET ORAL EVERY 6 HOURS
Qty: 30 TABLET | Refills: 0 | Status: SHIPPED | OUTPATIENT
Start: 2023-11-19

## 2023-11-19 RX ORDER — OXYCODONE HYDROCHLORIDE 5 MG/1
5 TABLET ORAL EVERY 4 HOURS PRN
Qty: 10 TABLET | Refills: 0 | Status: SHIPPED | OUTPATIENT
Start: 2023-11-19 | End: 2023-12-05 | Stop reason: ALTCHOICE

## 2023-11-19 RX ORDER — ACETAMINOPHEN 325 MG/1
650 TABLET ORAL EVERY 6 HOURS
Qty: 30 TABLET | Refills: 0 | Status: SHIPPED | OUTPATIENT
Start: 2023-11-19 | End: 2023-11-19 | Stop reason: SDUPTHER

## 2023-11-19 RX ORDER — AMOXICILLIN 250 MG
1 CAPSULE ORAL NIGHTLY PRN
Qty: 30 TABLET | Refills: 0 | Status: SHIPPED | OUTPATIENT
Start: 2023-11-19

## 2023-11-19 RX ORDER — IBUPROFEN 800 MG/1
800 TABLET ORAL EVERY 8 HOURS
Qty: 30 TABLET | Refills: 0 | Status: SHIPPED | OUTPATIENT
Start: 2023-11-19

## 2023-11-19 RX ORDER — FERROUS SULFATE 325(65) MG
325 TABLET ORAL
Qty: 30 TABLET | Refills: 0 | Status: SHIPPED | OUTPATIENT
Start: 2023-11-19 | End: 2023-12-05 | Stop reason: ALTCHOICE

## 2023-11-19 RX ORDER — FERROUS SULFATE 325(65) MG
325 TABLET ORAL
Qty: 30 TABLET | Refills: 0 | Status: SHIPPED | OUTPATIENT
Start: 2023-11-19 | End: 2023-11-19 | Stop reason: SDUPTHER

## 2023-11-19 RX ORDER — IBUPROFEN 800 MG/1
800 TABLET ORAL EVERY 8 HOURS
Qty: 30 TABLET | Refills: 0 | Status: SHIPPED | OUTPATIENT
Start: 2023-11-19 | End: 2023-11-19 | Stop reason: SDUPTHER

## 2023-11-19 RX ORDER — AMOXICILLIN 250 MG
1 CAPSULE ORAL NIGHTLY PRN
Qty: 30 TABLET | Refills: 0 | Status: SHIPPED | OUTPATIENT
Start: 2023-11-19 | End: 2023-11-19 | Stop reason: SDUPTHER

## 2023-11-19 RX ORDER — OXYCODONE HYDROCHLORIDE 5 MG/1
5 TABLET ORAL EVERY 4 HOURS PRN
Qty: 10 TABLET | Refills: 0 | Status: SHIPPED | OUTPATIENT
Start: 2023-11-19 | End: 2023-11-19 | Stop reason: SDUPTHER

## 2023-11-19 RX ADMIN — DOCUSATE SODIUM 200 MG: 100 CAPSULE, LIQUID FILLED ORAL at 08:11

## 2023-11-19 RX ADMIN — SIMETHICONE 80 MG: 80 TABLET, CHEWABLE ORAL at 10:11

## 2023-11-19 RX ADMIN — PRENATAL VIT W/ FE FUMARATE-FA TAB 27-0.8 MG 1 TABLET: 27-0.8 TAB at 08:11

## 2023-11-19 RX ADMIN — SIMETHICONE 80 MG: 80 TABLET, CHEWABLE ORAL at 03:11

## 2023-11-19 RX ADMIN — OXYCODONE HYDROCHLORIDE 5 MG: 5 TABLET ORAL at 05:11

## 2023-11-19 RX ADMIN — IBUPROFEN 800 MG: 400 TABLET, FILM COATED ORAL at 05:11

## 2023-11-19 RX ADMIN — ACETAMINOPHEN 650 MG: 325 TABLET, FILM COATED ORAL at 03:11

## 2023-11-19 RX ADMIN — ACETAMINOPHEN 650 MG: 325 TABLET, FILM COATED ORAL at 10:11

## 2023-11-19 RX ADMIN — POLYSACCHARIDE-IRON COMPLEX 150 MG: 150 CAPSULE ORAL at 08:11

## 2023-11-19 NOTE — PLAN OF CARE
Pt ambulating, voiding, and passing flatus. Pt tolerating PO well and no SS of distress at this time. Pt's pain well controlled throughout shift by oral pain medication. Pts bleeding has been light throughout shift. Fundus is firm. Mother baby care guide reviewed. All questions answered. Pt verbalized understanding to follow up with OB X6 weeks. Medications sent to outside pharmacy. Reviewed medication list, when to call provider, and SS of infection. Pt stable at this time. ID band verified.

## 2023-11-19 NOTE — DISCHARGE SUMMARY
Delivery Discharge Summary  Obstetrics      Primary OB Clinician: Dr. Leroy    Admission date: 2023  Discharge date: 2023    Disposition: To home, self care    Discharge Diagnosis List:      Patient Active Problem List   Diagnosis    Acute viral pharyngitis    Cough    Fever blister    Amenorrhea    GBS (group B Streptococcus carrier), +RV culture, currently pregnant    History of  delivery, currently pregnant    Multigravida of advanced maternal age in third trimester    Status post        Procedure: , due to repeat    Hospital Course:  Nickie Clemente is a 43 y.o. now , POD #2 who was admitted on 2023 for scheduled repeat . Patient was subsequently admitted to labor and delivery unit with signed consents.     C-sections was performed without complications. Please see delivery note for further details. Her postpartum course was uncomplicated. On discharge day, patient's pain is controlled with oral pain medications. Pt is tolerating ambulation without SOB or CP, and regular diet without N/V. Reports lochia is mild. Denies any HA, vision changes, F/C, LE swelling. Denies any breast pain/soreness.    Pt in stable condition and ready for discharge. She has been instructed to start and/or continue medications and follow up with her obstetrics provider as listed below.    Pertinent studies:  CBC  Recent Labs   Lab 23  0524 23  0559   WBC 7.83 11.95   HGB 11.4* 9.3*   HCT 34.7* 28.5*   MCV 88 90   * 107*          Immunization History   Administered Date(s) Administered    Hepatitis B, Adult 2001, 2001, 10/02/2001    Influenza - Quadrivalent - PF *Preferred* (6 months and older) 2008, 2010, 2011, 2014, 2015    MMR 07/15/1981, 1996    PPD Test 2013, 2014, 04/15/2015    Td - PF (ADULT) 1994, 2007    Tdap 10/06/2023        Delivery:    Episiotomy:     Lacerations:    "  Repair suture:     Repair # of packets:     Blood loss (ml):       Birth information:  YOB: 2024   Time of birth: 7:33 AM   Sex: male   Delivery type: , Low Transverse   Gestational Age: 39w0d     Measurements    Weight: 3580 g  Weight (lbs): 7 lb 14.3 oz  Length: 50.8 cm  Length (in): 20"  Head circumference: 34.9 cm  Chest circumference: 34.3 cm         Delivery Clinician: Delivery Providers    Delivering clinician: Dory Leroy MD   Provider Role    Elva Lake MD Duhe, Emily P., BEN Molina, Josselyn Burkett, BEN Cruz, Mikki CARTER              Additional  information:  Forceps:    Vacuum:    Breech:    Observed anomalies      Living?:     Apgars    Living status: Living  Apgar Component Scores:  1 min.:  5 min.:  10 min.:  15 min.:  20 min.:    Skin color:  1  1       Heart rate:  2  2       Reflex irritability:  2  2       Muscle tone:  2  2       Respiratory effort:  2  2       Total:  9  9       Apgars assigned by: COLLIN MOLINA         Placenta: Delivered:       appearance    Patient Instructions:   Current Discharge Medication List        START taking these medications    Details   acetaminophen (TYLENOL) 325 MG tablet Take 2 tablets (650 mg total) by mouth every 6 (six) hours.  Qty: 30 tablet, Refills: 0      ferrous sulfate (IRON) 325 mg (65 mg iron) Tab tablet Take 1 tablet (325 mg total) by mouth daily with breakfast.  Qty: 30 tablet, Refills: 0      ibuprofen (ADVIL,MOTRIN) 800 MG tablet Take 1 tablet (800 mg total) by mouth every 8 (eight) hours.  Qty: 30 tablet, Refills: 0      oxyCODONE (ROXICODONE) 5 MG immediate release tablet Take 1 tablet (5 mg total) by mouth every 4 (four) hours as needed for Pain.  Qty: 10 tablet, Refills: 0    Comments: Quantity prescribed more than 7 day supply? No      senna-docusate 8.6-50 mg (PERICOLACE) 8.6-50 mg per tablet Take 1 tablet by mouth nightly as needed for Constipation.  Qty: 30 tablet, Refills: 0           STOP taking " these medications       prenatal vit37/iron/folic acid (PRENATA ORAL) Comments:   Reason for Stopping:               Discharge Procedure Orders   Diet Adult Regular     No driving until:   Order Comments: No driving until not taking narcotic pain medication.     Pelvic Rest   Order Comments: Pelvic rest until 6 weeks after discharge. Nothing in vagina -no sex, tampons, douching, etc.     Notify your health care provider if you experience any of the following:  temperature >100.4     Notify your health care provider if you experience any of the following:  persistent nausea and vomiting or diarrhea     Notify your health care provider if you experience any of the following:  severe uncontrolled pain     Notify your health care provider if you experience any of the following:  redness, tenderness, or signs of infection (pain, swelling, redness, odor or green/yellow discharge around incision site)     Notify your health care provider if you experience any of the following:  difficulty breathing or increased cough     Notify your health care provider if you experience any of the following:  severe persistent headache     Notify your health care provider if you experience any of the following:  worsening rash     Notify your health care provider if you experience any of the following:  persistent dizziness, light-headedness, or visual disturbances     Notify your health care provider if you experience any of the following:  increased confusion or weakness     Notify your health care provider if you experience any of the following:   Order Comments: Heavy vaginal bleeding saturating more than 1 pad per hr for at least consecutive 2 hrs.     Activity as tolerated        Follow-up Information       Dory Leroy MD Follow up in 6 week(s).    Specialty: Obstetrics and Gynecology  Why: post-op follow up  Contact information:  [address] [address]  [phone]

## 2023-11-19 NOTE — LACTATION NOTE
"   11/19/23 0900   Maternal Assessment   Breast Shape Bilateral:;pendulous   Breast Density Bilateral:;soft;filling   Areola Bilateral:;elastic;surgical scarring   Nipples Bilateral:;everted   Left Nipple Symptoms blisters;painful   Right Nipple Symptoms painful;blisters   Maternal Infant Feeding   Maternal Emotional State assist needed   Infant Positioning clutch/football;laid back (ventral)   Signs of Milk Transfer audible swallow;infant jaw motion present   Pain with Feeding yes  ("9-10" down to "6-7"; laid back was "6-7" down to "4-5")   Pain Location nipples, bilateral   Pain Description sharp   Comfort Measures Before/During Feeding infant position adjusted;latch adjusted;suction broken using finger   Comfort Measures Following Feeding expressed milk applied;other (see comments)  (hydrogels)   Nipple Shape After Feeding, Left used nipple shield, pulled out   Nipple Shape After Feeding, Right slightly pinched   Latch Assistance yes   Breast Pumping   Breast Pumping hand expression utilized     Lactation note:  The infant is expected to be discharged home today. The infant has lost 8.2% weight from birth and has had 4 voids and 4 stools in last 24 hours. Using the breastfeeding guide, the family was given breastfeeding information for discharge home. Assisted with latching more deeply as mom reports painful latch on. Laid back position and nipple shield were helpful but mom to follow up with provider to evaluate for tongue tie. The feeding plan for home was reviewed. The mother will continue to feed the infant with cues 8 or more times in 24 hours until content. Mom to pump extra to give breast milk and stimulate supply over next 2 weeks. The mother has a breast pump from insurance. The mother is aware of resources for breastfeeding assistance at home in her breastfeeding guide and on AVS.  phone number on board provided for further needs.    "

## 2023-11-19 NOTE — PLAN OF CARE
VSS overnight. Patient denies HA, dizziness, vision changes, but does report intermittent, severe RUQ pain that has been improved with medication (simethicone), heat application, and positioning. Pt safety maintained, side rails up, bed low and locked position. Pt ambulating independently and voiding without difficulty. Pain well controlled with scheduled and PRN pain medication and heat. Fundus is midline and firm with light lochia. Hydrocolloid dressing in place; dressing is clean and dry with no signs of infection. Significant other at bedside and attentive to patient's needs; parents responding to infant cues and bonding appropriately. No additional needs at this time.

## 2023-11-19 NOTE — PROGRESS NOTES
"POSTPARTUM PROGRESS NOTE    Subjective:     PPD/POD#: 2   Procedure: Repeat LTCS   EGA: 39w0d   N/V: No   F/C: No   Abd Pain: Mild, well-controlled with oral pain medication   Lochia: Mild   Voiding: Yes   Ambulating: Yes   Bowel fnc: Yes   Contraception: Per primary OB     Patient complaining of abdominal pain upon palpation. Recommended PRN pain medication and reassessment of pain.    Objective:      Temp:  [97.5 °F (36.4 °C)-98.7 °F (37.1 °C)] 98.1 °F (36.7 °C)  Pulse:  [68-78] 73  Resp:  [16-18] 17  SpO2:  [96 %-99 %] 98 %  BP: (107-132)/(57-64) 117/64    Abdomen: Soft, appropriately tender   Uterus: Firm, diffuse abdominal tenderness, some fundal tenderness noted   Incision: Bandage in place without shadowing     Lab Review    No results for input(s): "NA", "K", "CL", "CO2", "BUN", "CREATININE", "GLU", "PROT", "BILITOT", "ALKPHOS", "ALT", "AST", "MG", "PHOS" in the last 168 hours.    Recent Labs   Lab 23  0524 23  0559   WBC 7.83 11.95   HGB 11.4* 9.3*   HCT 34.7* 28.5*   MCV 88 90   * 107*           I/O  No intake or output data in the 24 hours ending 23 0104       Assessment and Plan:   Postpartum care:  - Patient doing well.  - Continue routine management and advances.    AMA   - Lovenox 40 qD due to  section and age >40    Anemia   - H/H as above  - QBL: 795  - asymptomatic  - iron/colace      Marta Loomis MD  Obstetrics and Gynecology - PGY1       "

## 2023-11-19 NOTE — PLAN OF CARE
Lactation note:  The mother is expected to be discharged home today. Using the breastfeeding guide, breastfeeding information for discharge was reviewed, including sore nipples and breast engorgement. Mom is experiencing a lot of nipple pain with latching. Assisted with latching more deeply in football, laid back and even a nipple shield to help with nipple pain.  Mom states nipple shield helped a little but laid back was best. Noted mom has blisters to nipples and baby with possible tongue tie. Hydrogels given to use between feedings. Nipple shield precautions and care reviewed if mom continues to use. The feeding plan for baby at home was reviewed. The mother will continue to feed her baby with cues and at least 8 times in 24 hours.  If latch too painful or baby hungry after nursing, mom to pump and offer expressed milk. The patient was taught how to perform hand expression and she has a breast pump from insurance. Mom to follow up with an provider to evaluate baby for tongue tie. OPC here at [facility] [place] offered as well. The lactation phone number is on the board to call for further needs.  Additional resources were placed on her AVS to be given at discharge.

## 2023-11-20 ENCOUNTER — TELEPHONE (OUTPATIENT)
Dept: LACTATION | Facility: CLINIC | Age: 43
End: 2023-11-20
Payer: COMMERCIAL

## 2023-11-20 NOTE — TELEPHONE ENCOUNTER
"Patient called support line with questions about pumping while breastfeeding. Reports baby is nursing but continues to experience pain with breastfeeding. States her nipples are cracked and bleeding. She is currently using hydrogel pads. In the process of making an appointment for infant with Dr Ramirez for evaluation of tethered oral tissue. Patient used nipple shield for the first time this morning. States it helped some with nipple pain she is experiencing while nursing. Patient also reported that she pumped once x5 min but "didn't express anything but a few drops in the flange". Parents are keeping I&O log. States baby has had 3 voids and 5 medium dark "less sticky" stools in the third 24 hours. States she does not think her milk has come in and her breasts do not feel duncan. Baby has first follow up appointment with pediatrician this afternoon. Discussed pumping for added stimulation and empty to protect milk supply if using a nipple shield and if baby would need a supplement that she can provide EBM. Proper flange fit discussed, highest suction that is most comfortable and pumping for a full x15 min. Encouraged to call her OB provider for [facility]. Encouraged to call warm line back after pediatrician appointment if any concern with baby's weight or feedings to alter plan of care for feedings.   "

## 2023-11-20 NOTE — TELEPHONE ENCOUNTER
----- Message from Aliya Guy sent at 11/20/2023 12:31 PM CST -----  Contact: TED HOPSON [[id number]]  Type: Call Back      Who called: MARK ANTHONY TED ALLISON [[id number]]      What is the request in detail: Patient is requesting a call back. Pt would like to know if she can get a prescription for a cream for her nipples. She states that there is pain, sore, and cracked. She also would like to schedule her postpartum visit.   Please advise.     Can the clinic reply by [facility]? Yes      Would the patient rather a call back or a response via [facility]? Call back       Best call back number: [phone] (home)       Additional Information:   [id number]  [place] - [address]  [address]  [place] [address]  Phone: [phone] Fax: [phone]

## 2023-11-21 ENCOUNTER — TELEPHONE (OUTPATIENT)
Dept: OBSTETRICS AND GYNECOLOGY | Facility: CLINIC | Age: 43
End: 2023-11-21
Payer: COMMERCIAL

## 2023-11-21 NOTE — TELEPHONE ENCOUNTER
Called in lonnie arshad ointment Rx for patient to [facility] pharmacy. Patient have been notified

## 2023-11-21 NOTE — PHYSICIAN QUERY
PT Name: Nickie Clemente  MR #: [id number]    DOCUMENTATION CLARIFICATION      CDS/: DELIA Gutierres,RNC-MNN        Contact information:[email or web address]    This form is a permanent document in the medical record.      Query Date: 2023    By submitting this query, we are merely seeking further clarification of documentation. Please utilize your independent clinical judgment when addressing the question(s) below.    The Medical Record contains the following:   Indicators  Supporting Clinical Findings Location in Medical Record   X Anemia documented Anemia OB Progress note @756am   X H&H Recent Labs   Lab 23  0524 23  0559   WBC 7.83 11.95   HGB 11.4* 9.3*   HCT 34.7* 28.5*   MCV 88 90   * 107*     OB Progress note @756am    BP                    HR      Bleeding     X Procedure/Surgery Performed/EBL Procedure: Repeat Low Transverse  Section via pfannenstiel skin incision     Estimated Blood Loss:  795 mL  L&D Delivery note     Transfusion(s)     X Acute/Chronic illness Repeat LTCS  OB Progress note @756am   X Treatments iron/colace  OB Progress note @756am    Other       Provider, please further specify the diagnosis of anemia.   [   ] Acute blood loss anemia    [  x ] Acute blood loss anemia expected post-operatively    [   ] Iron deficiency anemia    [   ] Other : _________________   [   ] Clinically Undetermined     Please document in your progress notes daily for the duration of treatment, until resolved, and include in your discharge summary.    Form No. [id number]

## 2023-11-21 NOTE — TELEPHONE ENCOUNTER
----- Message from Angelita Harrison sent at 11/21/2023  4:14 PM CST -----  Patient is calling she spoke with someone yesterday about a nipple cream (lonnie arshad ointment). Looks like Kelley called it into [facility] but this cream is a compound medication and [facility] does not fill that patient is requesting for it to be sent to [facility]. Patient is requesting a call back to advise when this medication is called into [facility]. Patient can be reached at [phone].

## 2023-12-05 ENCOUNTER — POSTPARTUM VISIT (OUTPATIENT)
Dept: OBSTETRICS AND GYNECOLOGY | Facility: CLINIC | Age: 43
End: 2023-12-05
Payer: COMMERCIAL

## 2023-12-05 VITALS
WEIGHT: 144.38 LBS | BODY MASS INDEX: 26.57 KG/M2 | DIASTOLIC BLOOD PRESSURE: 80 MMHG | HEIGHT: 62 IN | SYSTOLIC BLOOD PRESSURE: 130 MMHG

## 2023-12-05 DIAGNOSIS — R63.39 DIFFICULTY IN FEEDING AT BREAST: ICD-10-CM

## 2023-12-05 DIAGNOSIS — Z98.891 STATUS POST C-SECTION: ICD-10-CM

## 2023-12-05 DIAGNOSIS — Z09 POSTOPERATIVE EXAMINATION: Primary | ICD-10-CM

## 2023-12-05 PROCEDURE — 99024 POSTOP FOLLOW-UP VISIT: CPT | Mod: S$GLB,,, | Performed by: OBSTETRICS & GYNECOLOGY

## 2023-12-05 PROCEDURE — 99024 PR POST-OP FOLLOW-UP VISIT: ICD-10-PCS | Mod: S$GLB,,, | Performed by: OBSTETRICS & GYNECOLOGY

## 2023-12-05 NOTE — PROGRESS NOTES
Subjective:      Nickie Clemente is a 43 y.o.  who presents for a posop visit.  She is status post   delivery secondary to history of prior CD  2 weeks ago.  Her hospitalization was not complicated.  She is breastfeeding experiencing difficulty with production and latch.  She desires no method for contraception.  She reports mood is appropriate.  She is tearful and feels defeated with the difficulty breast feeding but denies other signs or symptoms of PP depression.  Denies SI/HI.          2023     9:47 AM   [place]  Depression Scale   I have been able to laugh and see the funny side of things. 0   I have looked forward with enjoyment to things. 1   I have blamed myself unnecessarily when things went wrong. 1   I have been anxious or worried for no good reason. 1   I have felt scared or panicky for no good reason. 0   Things have been getting on top of me. 2   I have been so unhappy that I have had difficulty sleeping. 0   I have felt sad or miserable. 1   I have been so unhappy that I have been crying. 1   The thought of harming myself has occurred to me. 0     Total: 7      History reviewed. No pertinent past medical history.    Current Outpatient Medications:     acetaminophen (TYLENOL) 325 MG tablet, Take 2 tablets (650 mg total) by mouth every 6 (six) hours., Disp: 30 tablet, Rfl: 0    ibuprofen (ADVIL,MOTRIN) 800 MG tablet, Take 1 tablet (800 mg total) by mouth every 8 (eight) hours., Disp: 30 tablet, Rfl: 0    jack arshad ointment, Apply topically 3 (three) times daily. Apply after feeding. Do not wash off. This compounded medication expires in 30 days., Disp: 30 g, Rfl: 1    jack arshad ointment, APPLY TO NIPPLES AFTER EACH FEEDING AND DO NOT WASH OFF, Disp: , Rfl:     senna-docusate 8.6-50 mg (PERICOLACE) 8.6-50 mg per tablet, Take 1 tablet by mouth nightly as needed for Constipation., Disp: 30 tablet, Rfl: 0      Objective:     Vitals:    23 0945   BP: 130/80        APPEARANCE: Well nourished, well developed, in no acute distress.  PSYCH: Appropriate mood and affect.  NECK:  Supple, no thyromegaly.  BREASTS:  No masses, no nipple discharge.  CARDIOVASCULAR:  Regular rate and rhythm.  LUNGS:  Clear to auscultation bilaterally.  ABDOMEN:  Soft, nontender.  INCISION: Well healed.  EXTREMITIES: No edema.      Assessment:     Postoperative examination    Status post     Difficulty in feeding at breast        Plan:     1. Postpartum course reviewed and discussed with patient.  All questions answered.    2. Contraception: declined.  3. Recommend adequate hydration, caloric intake, Mother's Milk Tea.  If refractory, consider follow up with Dr. Ramirez.   4. OK to discontinue iron and resume PNV.  5. No lifting >15 pounds. OK to resume driving when feeling able.  6. Return to clinic in 3 weeks for PP examination.        Dory Leroy MD  [facility]  2023

## 2023-12-08 ENCOUNTER — PATIENT MESSAGE (OUTPATIENT)
Dept: PERINATAL CARE | Facility: OTHER | Age: 43
End: 2023-12-08
Payer: COMMERCIAL

## 2023-12-26 ENCOUNTER — POSTPARTUM VISIT (OUTPATIENT)
Dept: OBSTETRICS AND GYNECOLOGY | Facility: CLINIC | Age: 43
End: 2023-12-26
Attending: STUDENT IN AN ORGANIZED HEALTH CARE EDUCATION/TRAINING PROGRAM
Payer: COMMERCIAL

## 2023-12-26 VITALS
BODY MASS INDEX: 26.85 KG/M2 | DIASTOLIC BLOOD PRESSURE: 74 MMHG | WEIGHT: 146.81 LBS | SYSTOLIC BLOOD PRESSURE: 125 MMHG | HEART RATE: 65 BPM

## 2023-12-26 DIAGNOSIS — R63.39 DIFFICULTY IN FEEDING AT BREAST: ICD-10-CM

## 2023-12-26 PROCEDURE — 0503F PR POSTPARTUM CARE VISIT: ICD-10-PCS | Mod: CPTII,S$GLB,, | Performed by: OBSTETRICS & GYNECOLOGY

## 2023-12-26 PROCEDURE — 0503F POSTPARTUM CARE VISIT: CPT | Mod: CPTII,S$GLB,, | Performed by: OBSTETRICS & GYNECOLOGY

## 2023-12-26 NOTE — PROGRESS NOTES
Subjective:      Nickie Clemente is a 43 y.o.  who presents for a posop visit.  She is status post   delivery secondary to history of prior CD  2 weeks ago.  Her hospitalization was not complicated. Met with Dr. Ramirez but continues to experience difficulty with production and latch.  Currently using Body armor and coconut milk. Able to produce 1-2 oz per pumping. She desires no method for contraception at this time.  Will review options with her .  She reports mood is appropriate.  She is tearful and feels defeated with the difficulty breast feeding but feels better than last visit. Denies signs or symptoms of PP depression.  Denies SI/HI.          2023     9:49 AM 2023     9:47 AM   [place]  Depression Scale   I have been able to laugh and see the funny side of things. 0 0   I have looked forward with enjoyment to things. 0 1   I have blamed myself unnecessarily when things went wrong. 2 1   I have been anxious or worried for no good reason. 0 1   I have felt scared or panicky for no good reason. 0 0   Things have been getting on top of me. 0 2   I have been so unhappy that I have had difficulty sleeping. 0 0   I have felt sad or miserable. 2 1   I have been so unhappy that I have been crying. 1 1   The thought of harming myself has occurred to me. 0 0     Total: 5      History reviewed. No pertinent past medical history.    Current Outpatient Medications:     acetaminophen (TYLENOL) 325 MG tablet, Take 2 tablets (650 mg total) by mouth every 6 (six) hours., Disp: 30 tablet, Rfl: 0    ibuprofen (ADVIL,MOTRIN) 800 MG tablet, Take 1 tablet (800 mg total) by mouth every 8 (eight) hours., Disp: 30 tablet, Rfl: 0    senna-docusate 8.6-50 mg (PERICOLACE) 8.6-50 mg per tablet, Take 1 tablet by mouth nightly as needed for Constipation., Disp: 30 tablet, Rfl: 0      Objective:     Vitals:    23 0947   BP: 125/74   Pulse: 65       APPEARANCE: Well nourished, well  developed, in no acute distress.  PSYCH: Appropriate mood and affect.  NECK:  Supple, no thyromegaly.  BREASTS:  No masses, no nipple discharge.  CARDIOVASCULAR:  Regular rate and rhythm.  LUNGS:  Clear to auscultation bilaterally.  ABDOMEN:  Soft, nontender.  INCISION: Well healed.  EXTREMITIES: No edema.      Assessment:     Postpartum examination following  delivery    Difficulty in feeding at breast          Plan:     1. Postpartum course reviewed and discussed with patient.  All questions answered.    2. Contraception: declined. Discussed nonhormonal options including condoms, Phexxi, and vasectomy.  3. OK to resume normal activity and exercise.   4. RTC May 2024 for annual and MMG.         Dory Leroy MD  [facility]  2023

## 2024-06-22 ENCOUNTER — OFFICE VISIT (OUTPATIENT)
Dept: URGENT CARE | Facility: CLINIC | Age: 44
End: 2024-06-22
Payer: COMMERCIAL

## 2024-06-22 VITALS
OXYGEN SATURATION: 98 % | HEIGHT: 62 IN | BODY MASS INDEX: 25.76 KG/M2 | HEART RATE: 70 BPM | WEIGHT: 140 LBS | RESPIRATION RATE: 18 BRPM | DIASTOLIC BLOOD PRESSURE: 64 MMHG | TEMPERATURE: 98 F | SYSTOLIC BLOOD PRESSURE: 132 MMHG

## 2024-06-22 DIAGNOSIS — H10.9 CONJUNCTIVITIS, UNSPECIFIED CONJUNCTIVITIS TYPE, UNSPECIFIED LATERALITY: Primary | ICD-10-CM

## 2024-06-22 PROCEDURE — 99203 OFFICE O/P NEW LOW 30 MIN: CPT | Mod: S$GLB,,, | Performed by: FAMILY MEDICINE

## 2024-06-22 RX ORDER — OFLOXACIN 3 MG/ML
1 SOLUTION/ DROPS OPHTHALMIC 4 TIMES DAILY
Qty: 5 ML | Refills: 0 | Status: SHIPPED | OUTPATIENT
Start: 2024-06-22

## 2024-06-22 NOTE — PROGRESS NOTES
"Subjective:      Patient ID: Nickie Clemente is a 43 y.o. female.    Vitals:  height is 5' 2" (1.575 m) and weight is 63.5 kg (140 lb).     Chief Complaint: Eye Problem    Pt complains of wayery crusty eyes since yesterday and thinks its just allergies from pets. Right eye is more itchy, left eye is more crusty.Works as Childrens care taker  Some crusty drainage from right eye, and now getting into left eye  No uri sx      Eye Problem   Both eyes are affected. This is a new problem. The current episode started yesterday. The problem occurs constantly. The problem has been unchanged. There was no injury mechanism. The pain is at a severity of 2/10. The pain is mild. There is No known exposure to pink eye. Associated symptoms include itching. She has tried nothing for the symptoms. The treatment provided no relief.       Eyes:  Positive for eye itching.    Objective:     Physical Exam   Constitutional: She is oriented to person, place, and time. She appears well-developed. She is cooperative.  Non-toxic appearance. She does not appear ill. No distress.   HENT:   Head: Normocephalic and atraumatic.   Ears:   Right Ear: Hearing, tympanic membrane, external ear and ear canal normal.   Left Ear: Hearing, tympanic membrane, external ear and ear canal normal.   Nose: Nose normal. No mucosal edema, rhinorrhea or nasal deformity. No epistaxis. Right sinus exhibits no maxillary sinus tenderness and no frontal sinus tenderness. Left sinus exhibits no maxillary sinus tenderness and no frontal sinus tenderness.   Mouth/Throat: Uvula is midline, oropharynx is clear and moist and mucous membranes are normal. No trismus in the jaw. Normal dentition. No uvula swelling. No posterior oropharyngeal erythema.   Eyes: Lids are normal. Pupils are equal, round, and reactive to light. Right eye exhibits no discharge. Left eye exhibits no discharge. No scleral icterus. Extraocular movement intact      Comments: Both conjunctivae with " erythema, no FB     Neck: Trachea normal and phonation normal. Neck supple.   Cardiovascular: Normal rate, regular rhythm, normal heart sounds and normal pulses.   Pulmonary/Chest: Effort normal and breath sounds normal. No respiratory distress.   Abdominal: Normal appearance and bowel sounds are normal. She exhibits no distension and no mass. Soft. There is no abdominal tenderness.   Musculoskeletal: Normal range of motion.         General: No deformity. Normal range of motion.   Neurological: She is alert and oriented to person, place, and time. She exhibits normal muscle tone. Coordination normal.   Skin: Skin is warm, dry, intact, not diaphoretic and not pale.   Psychiatric: Her speech is normal and behavior is normal. Judgment and thought content normal.   Nursing note and vitals reviewed.      Assessment:     1. Conjunctivitis, unspecified conjunctivitis type, unspecified laterality        Plan:       Conjunctivitis, unspecified conjunctivitis type, unspecified laterality    Other orders  -     ofloxacin (OCUFLOX) 0.3 % ophthalmic solution; Place 1 drop into both eyes 4 (four) times daily.  Dispense: 5 mL; Refill: 0

## 2024-07-16 NOTE — PROGRESS NOTES
"Progress Note - Behavioral Health   Deyvi Cao 54 y.o. male MRN: [id number]  Unit/Bed#: [facility] 112-02 Encounter: [id number]  Code Status: Level 1 - Full Code    Assessment & Plan   Principal Problem:    Bipolar disorder with severe depression (HCC)  Active Problems:    Benign essential hypertension    Mixed hyperlipidemia    Allergic rhinitis due to allergen    Medical clearance for psychiatric admission    Rosacea    Recommended Treatment:     Treatment plan, treatment progress and medication changes were reviewed with Nursing Staff, Pharmacy Service and Case Management in Treatment Team:  1.Continue with group therapy, milieu therapy and occupational therapy   2.Behavioral Health checks every 7 minutes   3.Continue frequent safety checks and vitals per unit protocol  4.Continue with SLIM medical management as indicated  5.Continue with current medication regimen for symptom management: Vraylar 3mg PO Daily, Atarax 25mg PO TID, Lamictal 50mg PO Daily, Zoloft 150mg PO QHS, Lamictal 50mg PO Daily    6.Disposition Planning: Discharge planning and efforts remain ongoing - Awaiting group home placement    Subjective:    Patient was seen today for continuation of care, records reviewed and patient was discussed with the morning case review team.    Deyvi was seen today for psychiatric follow-up.  On assessment today, Deyvi was found laying in bed.  Reports depression 2/10 (10 being the worse) and tells me this is the closest he's been to feeling like himself since he got \"sick\" (referring to his mental health).  He still appears withdrawn and down in approach, often seem laying in his bed staring at the ceiling.  He enjoys art and exercising.  Deyvi reports adequate daytime energy and denies any difficulties with initiating or staying asleep.  Oral appetite and hydration is adequate. We reviewed once more the specific as-needed medications they can use going forward if they experience any insomnia or " Here for Tdap injection. Patient without complaint of pain at this time, injection given. Tolerated well no pain noted post injection advised to wait in lobby 15 minutes and report any adverse reactions.     Site:ld       destabilization of their mood, they understood and were agreeable. Milieu visibility and group attendance encouraged to promote an active participation in treatment.    Deyvi denies acute suicidal/self-harm ideation/intent/plan upon direct inquiry at this time. Deyvi is able to contract for safety while on the unit and would feel comfortable seeking staff support should suicidal symptoms or urges appear or worsen. Deyvi remains behaviorally appropriate, no agitation or aggression noted on exam or in report. Deyvi also denies HI/AH/VH, and does not appear overtly manic.  Patient does not verbalize any experiences that can be categorized as paranoid, persecutory, bizarre, or somatic delusions. Deyvi remains adherent to his current psychotropic medication regimen and denies any side effects from medications, as well as none noted on exam.    Group Attendance: 1 / 8  Treatment Team: SOLEDAD  Psychiatric PRN's Needed: None    Review of Systems:  Behavior over the last 24 hours: Unchanged  Sleep: sleeping okay throughout the night  Appetite: adequate  Medication side effects: none reported  ROS:no complaints, all other systems are negative    Objective:    Vitals:  Vitals:    07/16/24 0736   BP: 129/76   Pulse: 80   Resp: 18   Temp: (!) 97.2 °F (36.2 °C)   SpO2: 97%     Laboratory Results:  I have personally reviewed all pertinent laboratory/tests results.  Most Recent Labs:   Lab Results   Component Value Date    WBC 7.39 06/26/2024    RBC 4.70 06/26/2024    HGB 15.2 06/26/2024    HCT 45.5 06/26/2024     06/26/2024    RDW 12.9 06/26/2024    NEUTROABS 4.63 06/26/2024    SODIUM 137 06/26/2024    K 4.1 06/26/2024     06/26/2024    CO2 26 06/26/2024    BUN 17 06/26/2024    CREATININE 0.63 06/26/2024    GLUC 98 06/26/2024    GLUF 98 06/26/2024    CALCIUM 9.6 06/26/2024    AST 25 06/26/2024    ALT 45 06/26/2024    ALKPHOS 114 (H) 06/26/2024    TP 7.0 06/26/2024    ALB 4.4 06/26/2024    TBILI 0.44 06/26/2024     CHOLESTEROL 177 06/26/2024    HDL 52 06/26/2024    TRIG 73 06/26/2024    LDLCALC 110 (H) 06/26/2024    NONHDLC 125 06/26/2024    LITHIUM 0.4 (L) 01/28/2021    [id number] 2.636 06/26/2024    HGBA1C 5.2 11/22/2023     11/22/2023     Mental Status Evaluation:  Appearance:  dressed appropriately   Behavior:  guarded   Speech:  scant, soft   Mood:  depressed, anxious   Affect:  blunted   Thought Process:  poverty of thought   Associations: concrete associations   Thought Content:  no overt delusions   Perceptual Disturbances: no auditory hallucinations, no visual hallucinations, denies when asked, does not appear responding to internal stimuli   Risk Potential: Suicidal ideation - None at present, contracts for safety on the unit, would talk to staff if not feeling safe on the unit  Homicidal ideation - None at present  Potential for aggression - Not at present   Sensorium:  oriented to person, place, and time/date   Memory:  recent memory intact   Consciousness:  alert and awake   Attention/Concentration: attention span and concentration appear shorter than expected for age   Insight:  limited   Judgment: limited   Gait/Station: normal gait/station, normal balance   Motor Activity: no abnormal movements     Progress Toward Goals: no significant improvement.  Deyvi continues to require inpatient psychiatric hospitalization for continued medication management and titration to optimize symptom reduction, improve sleep hygiene, and demonstrate adequate self-care.     Suicide/Homicide Risk Assessment:  Risk of Harm to Self:   Nursing Suicide Risk Assessment Last 24 hours: C-SSRS Risk (Since Last Contact)  Calculated C-SSRS Risk Score (Since Last Contact): No Risk Indicated    Risk of Harm to Others:  Nursing Homicide Risk Assessment: Violence Risk to Others: Denies within past 6 months    Behavioral Health Medications: all current active meds have been reviewed and continue current psychiatric  medications.  Current Facility-Administered Medications   Medication Dose Route Frequency Provider Last Rate    acetaminophen  650 mg Oral Q6H PRN ALVINA Harley      acetaminophen  650 mg Oral Q4H PRN ALVINA Harley      acetaminophen  975 mg Oral Q6H PRN ALVINA Harley      aluminum-magnesium hydroxide-simethicone  30 mL Oral Q4H PRN ALVINA Harley      atorvastatin  10 mg Oral Daily ALVINA Lewis      benztropine  1 mg Intramuscular Q4H PRN Max 6/day ALVINA Harley      benztropine  1 mg Oral Q4H PRN Max 6/day ALVINA Harley      bisacodyl  10 mg Rectal Daily PRN ALVINA Harley      cariprazine  3 mg Oral Daily Martin Cardenas MD      Cholecalciferol  2,000 Units Oral Daily ALVINA Lewis      cyanocobalamin  1,000 mcg Oral Daily ALVINA Lewis      hydrOXYzine HCL  25 mg Oral Q6H PRN Max 4/day ALVINA Harley      hydrOXYzine HCL  25 mg Oral TID Martin Cardenas MD      hydrOXYzine HCL  50 mg Oral Q4H PRN Max 4/day ALVINA Harley      Or    LORazepam  1 mg Intramuscular Q4H PRN ALVINA Harley      lamoTRIgine  50 mg Oral Daily Martin Cardenas MD      lisinopril  10 mg Oral Daily ALVINA Lewis      LORazepam  1 mg Oral Q4H PRN Max 6/day ALVINA Harley      Or    LORazepam  2 mg Intramuscular Q6H PRN Max 3/day ALVINA Harley      OLANZapine  10 mg Oral Q3H PRN Max 3/day ALVINA Harley      Or    OLANZapine  10 mg Intramuscular Q3H PRN Max 3/day ALVINA Harley      OLANZapine  5 mg Oral Q3H PRN Max 6/day ALVINA Harley      Or    OLANZapine  5 mg Intramuscular Q3H PRN Max 6/day ALVINA Harley      OLANZapine  2.5 mg Oral Q3H PRN Max 8/day ALVINA Harley      polyethylene glycol  17 g Oral Daily PRN ALVINA Harley      propranolol  10 mg Oral Q8H PRN ALVINA Harley      senna-docusate sodium  1 tablet Oral Daily PRN ALVINA Harley      sertraline  150 mg Oral HS Martin Cardenas MD        Risks / Benefits of Treatment:  Risks, benefits, and possible side effects of medications explained to patient. Patient has limited understanding of risks and benefits of treatment at this time, but agrees to take medications as prescribed.    Counseling / Coordination of Care:  Total floor/unit time spent today 25 minutes. Greater than 50% of total time was spent with the patient and / or family counseling and / or coordination of care. A description of the counseling / coordination of care:   Patient's progress discussed with staff in treatment team meeting.  Medications, treatment progress and treatment plan reviewed with patient.   Educated on importance of medication and treatment compliance.  Reassurance and supportive therapy provided.   Encouraged participation in milieu and group therapy on the unit.    ALVINA Harley 07/16/24

## 2024-07-24 ENCOUNTER — OFFICE VISIT (OUTPATIENT)
Dept: OBSTETRICS AND GYNECOLOGY | Facility: CLINIC | Age: 44
End: 2024-07-24
Payer: COMMERCIAL

## 2024-07-24 VITALS
DIASTOLIC BLOOD PRESSURE: 72 MMHG | BODY MASS INDEX: 26.13 KG/M2 | WEIGHT: 142 LBS | SYSTOLIC BLOOD PRESSURE: 128 MMHG | HEIGHT: 62 IN

## 2024-07-24 DIAGNOSIS — Z01.419 ENCOUNTER FOR ANNUAL ROUTINE GYNECOLOGICAL EXAMINATION: Primary | ICD-10-CM

## 2024-07-24 DIAGNOSIS — Z12.31 BREAST CANCER SCREENING BY MAMMOGRAM: ICD-10-CM

## 2024-07-24 DIAGNOSIS — R19.00 ABDOMINAL WALL BULGE: ICD-10-CM

## 2024-07-24 DIAGNOSIS — Z12.11 COLON CANCER SCREENING: ICD-10-CM

## 2024-07-24 PROBLEM — O99.820 GBS (GROUP B STREPTOCOCCUS CARRIER), +RV CULTURE, CURRENTLY PREGNANT: Status: RESOLVED | Noted: 2023-11-02 | Resolved: 2024-07-24

## 2024-07-24 PROBLEM — N91.2 AMENORRHEA: Status: RESOLVED | Noted: 2023-05-11 | Resolved: 2024-07-24

## 2024-07-24 PROBLEM — O09.523 MULTIGRAVIDA OF ADVANCED MATERNAL AGE IN THIRD TRIMESTER: Status: RESOLVED | Noted: 2023-11-08 | Resolved: 2024-07-24

## 2024-07-24 PROBLEM — O34.219 ENCOUNTER FOR MATERNAL CARE FOR SCAR FROM REPEAT CESAREAN DELIVERY: Status: RESOLVED | Noted: 2023-11-08 | Resolved: 2024-07-24

## 2024-07-24 PROCEDURE — 3008F BODY MASS INDEX DOCD: CPT | Mod: CPTII,S$GLB,, | Performed by: OBSTETRICS & GYNECOLOGY

## 2024-07-24 PROCEDURE — 99999 PR PBB SHADOW E&M-EST. PATIENT-LVL IV: CPT | Mod: PBBFAC,,, | Performed by: OBSTETRICS & GYNECOLOGY

## 2024-07-24 PROCEDURE — 99396 PREV VISIT EST AGE 40-64: CPT | Mod: S$GLB,,, | Performed by: OBSTETRICS & GYNECOLOGY

## 2024-07-24 PROCEDURE — 1159F MED LIST DOCD IN RCRD: CPT | Mod: CPTII,S$GLB,, | Performed by: OBSTETRICS & GYNECOLOGY

## 2024-07-24 PROCEDURE — 3074F SYST BP LT 130 MM HG: CPT | Mod: CPTII,S$GLB,, | Performed by: OBSTETRICS & GYNECOLOGY

## 2024-07-24 PROCEDURE — 1160F RVW MEDS BY RX/DR IN RCRD: CPT | Mod: CPTII,S$GLB,, | Performed by: OBSTETRICS & GYNECOLOGY

## 2024-07-24 PROCEDURE — 3078F DIAST BP <80 MM HG: CPT | Mod: CPTII,S$GLB,, | Performed by: OBSTETRICS & GYNECOLOGY

## 2024-07-24 RX ORDER — MINOCYCLINE HYDROCHLORIDE 100 MG/1
100 CAPSULE ORAL
COMMUNITY
Start: 2024-07-03

## 2024-07-24 RX ORDER — SULFACETAMIDE SODIUM, SULFUR 98; 48 MG/G; MG/G
LIQUID TOPICAL 2 TIMES DAILY
COMMUNITY
Start: 2024-07-03

## 2024-07-24 RX ORDER — IVERMECTIN 10 MG/G
CREAM TOPICAL 2 TIMES DAILY
COMMUNITY
Start: 2024-07-03

## 2024-07-24 NOTE — PROGRESS NOTES
Chief Complaint: Well Woman Exam     HPI:      Nickie Clemente is a 43 y.o.  who presents today for well woman exam.  LMP: Patient's last menstrual period was 2024 (exact date).  Notes intermittent abdominal bulge near incision that is most notable with standing.  At times tender.  Also notes worsening stress urinary incontinence. Denies abnormal vaginal bleeding, discharge, pelvic pain, or changes in appetite. Ms. Clemente is currently sexually active with a single male partner. She is currently using condoms for contraception. She declines STD screening today.    Previous Pap:  no abnormalities (2023)  Previous Mammogram: No results found for this or any previous visit.  Most Recent Dexa: not indicated  Colonoscopy: never had    COVID vaccine: declined series  Gardasil:has never had     Patient Active Problem List   Diagnosis    Acute viral pharyngitis    Cough    Fever blister    Status post        Past Medical History:   Diagnosis Date    GBS (group B Streptococcus carrier), +RV culture, currently pregnant 2023    Urinary incontinence        Past Surgical History:   Procedure Laterality Date     SECTION      x2     SECTION N/A 2023    Procedure:  SECTION;  Surgeon: Dory Leroy MD;  [facility];  Service: OB/GYN;  Laterality: N/A;    LUMPECTOMY, BREAST Right        OB History          4    Para   4    Term   4            AB        Living   4         SAB        IAB        Ectopic        Multiple   0    Live Births   4           Obstetric Comments   R/6  No abnormal pap  No STDs   x 1, CD x 2               ROS:     Review of Systems   Constitutional:  Negative for activity change, fatigue and unexpected weight change.   Respiratory:  Negative for shortness of breath.    Cardiovascular:  Negative for chest pain.   Gastrointestinal:  Negative for abdominal pain, blood in stool, constipation and diarrhea.   Endocrine:  "Negative for cold intolerance and heat intolerance.   Genitourinary:  Positive for bladder incontinence. Negative for dyspareunia, dysuria, frequency, hot flashes, vaginal bleeding and vaginal dryness.   Integumentary:  Negative for breast mass, breast discharge and breast tenderness.   Psychiatric/Behavioral:  Negative for dysphoric mood. The patient is not nervous/anxious.    Breast: Negative for mass and tenderness      Physical Exam:      PHYSICAL EXAM:  /72   Ht 5' 2" (1.575 m)   Wt 64.4 kg (141 lb 15.6 oz)   LMP 07/02/2024 (Exact Date)   Breastfeeding No   BMI 25.97 kg/m²   Body mass index is 25.97 kg/m².     APPEARANCE: Well nourished, well developed, in no acute distress.  PSYCH: Appropriate mood and affect.  SKIN: No acne or hirsutism  NECK: Neck symmetric without masses or thyromegaly  NODES: No inguinal, axillary, or supraclavicular lymph node enlargement  ABDOMEN: Soft.  No tenderness or masses.    CARDIOVASCULAR: No edema of peripheral extremities  BREASTS: Symmetrical, no skin changes or visible lesions.  No palpable masses or nipple discharge bilaterally.  PELVIC: Normal external genitalia without lesions.  Normal hair distribution.  Adequate perineal body, normal urethral meatus.  Vagina moist and well rugated without lesions or discharge.  Cervix pink, without lesions, discharge or tenderness.  No significant cystocele or rectocele.  Bimanual exam shows uterus to be normal size, regular, mobile and nontender.  Adnexa without masses or tenderness.      Assessment:     1. Encounter for annual routine gynecological examination        2. Breast cancer screening by mammogram  Mammo Digital Screening Bilat w/ [facility]      3. Colon cancer screening  Ambulatory referral/consult to Endo Procedure       4. Abdominal wall bulge  US Abdomen Limited            Plan:     Clinical breast exam performed.  Pap UTD.  Mammogram ordered.  DEXA at 64.  Colonoscopy ordered.  Contraception: " condoms.  Abdominal bulge: US ordered to evaluate for incisional hernia.   Stress incontinence: declines PT, recommend Kegel's  Follow up in about 1 year (around 7/24/2025) for annual well woman exam or as needed.    Counseling:     Patient was counseled today on current ASCCP pap guidelines, the recommendation for yearly pelvic exams, healthy diet and exercise routines, breast self awareness and annual mammograms.She is to see her PCP for other health maintenance.     Use of the [facility] Patient Portal discussed and encouraged during today's visit.         Dory Leroy MD  [facility]  07/24/2024

## 2024-07-24 NOTE — PATIENT INSTRUCTIONS
Please check out the [employer] College of [employer] PATIENT WEBSITE.  The site has education materials, patient stories, expert views, and a portal for you to ask questions.       [email or web address]      As always, please let me know if you have any questions.     Dr. Dory Leroy

## 2024-08-01 ENCOUNTER — OFFICE VISIT (OUTPATIENT)
Dept: SURGERY | Facility: CLINIC | Age: 44
End: 2024-08-01
Payer: COMMERCIAL

## 2024-08-01 VITALS
SYSTOLIC BLOOD PRESSURE: 126 MMHG | WEIGHT: 141.75 LBS | HEIGHT: 62 IN | DIASTOLIC BLOOD PRESSURE: 80 MMHG | BODY MASS INDEX: 26.09 KG/M2

## 2024-08-01 DIAGNOSIS — K43.2 INCISIONAL HERNIA, WITHOUT OBSTRUCTION OR GANGRENE: ICD-10-CM

## 2024-08-01 PROCEDURE — 99999 PR PBB SHADOW E&M-EST. PATIENT-LVL III: CPT | Mod: PBBFAC,,, | Performed by: SURGERY

## 2024-08-01 PROCEDURE — 3074F SYST BP LT 130 MM HG: CPT | Mod: CPTII,S$GLB,, | Performed by: SURGERY

## 2024-08-01 PROCEDURE — 3008F BODY MASS INDEX DOCD: CPT | Mod: CPTII,S$GLB,, | Performed by: SURGERY

## 2024-08-01 PROCEDURE — 99204 OFFICE O/P NEW MOD 45 MIN: CPT | Mod: S$GLB,,, | Performed by: SURGERY

## 2024-08-01 PROCEDURE — 1159F MED LIST DOCD IN RCRD: CPT | Mod: CPTII,S$GLB,, | Performed by: SURGERY

## 2024-08-01 PROCEDURE — 3079F DIAST BP 80-89 MM HG: CPT | Mod: CPTII,S$GLB,, | Performed by: SURGERY

## 2024-08-01 NOTE — PROGRESS NOTES
[facility]  OUTPATIENT H&P    REASON FOR VISIT/CC:     HPI: Nickie Clemente is a 43 y.o. female with small reducible abdominal wall protrusion at c section scar.  She has had c section x 3, most recently 9 months ago.  She has no pain or changes in bowel habits.  U/S shows 1 cm fat containing hernia.    I have reviewed the patient's chart including prior progress notes, procedures and testing.     ROS:   Review of Systems   All other systems reviewed and are negative.      PROBLEM LIST:  Patient Active Problem List   Diagnosis    Acute viral pharyngitis    Cough    Fever blister    Status post          HISTORY  Past Medical History:   Diagnosis Date    GBS (group B Streptococcus carrier), +RV culture, currently pregnant 2023    Urinary incontinence        Past Surgical History:   Procedure Laterality Date     SECTION      x2     SECTION N/A 2023    Procedure:  SECTION;  Surgeon: Dory Leroy MD;  Location: [facility];  Service: OB/GYN;  Laterality: N/A;    LUMPECTOMY, BREAST Right        Social History     Tobacco Use    Smoking status: Never     Passive exposure: Never    Smokeless tobacco: Never   Substance Use Topics    Alcohol use: No    Drug use: Never       Family History   Problem Relation Name Age of Onset    Diabetes Mother Roxana     Heart disease Mother Roxana     Glaucoma Father      Breast cancer Neg Hx      Ovarian cancer Neg Hx      Colon cancer Neg Hx           MEDS:  Current Outpatient Medications on File Prior to Visit   Medication Sig Dispense Refill    minocycline (MINOCIN,DYNACIN) 100 MG capsule Take 100 mg by mouth.      SOOLANTRA 1 % Crea Apply topically 2 (two) times daily.      sulfacetamide sodium-sulfur 9.8-4.8 % Clsr Apply topically 2 (two) times daily.       No current facility-administered medications on file prior to visit.       ALLERGIES:  Review of patient's allergies indicates:  No Known  "Allergies      VITALS:  Vitals:    08/01/24 1335   BP: 126/80         PHYSICAL EXAM:  Physical Exam  Constitutional:       Appearance: Normal appearance.   HENT:      Head: Normocephalic and atraumatic.   Pulmonary:      Effort: Pulmonary effort is normal.   Abdominal:      Palpations: Abdomen is soft.      Comments: No palpable hernia on exam at present   Skin:     General: Skin is dry.   Neurological:      Mental Status: Mental status is at baseline.           LABS:  Lab Results   Component Value Date    WBC 11.95 11/18/2023    RBC 3.17 (L) 11/18/2023    HGB 9.3 (L) 11/18/2023    HCT 28.5 (L) 11/18/2023     (L) 11/18/2023     Lab Results   Component Value Date     (H) 05/09/2021     05/09/2021    K 3.8 05/09/2021     05/09/2021    CO2 20 (L) 05/09/2021    BUN 16 05/09/2021    CREATININE 0.77 05/09/2021    CALCIUM 9.7 05/09/2021     Lab Results   Component Value Date    ALT 21 05/09/2021    AST 28 05/09/2021    ALKPHOS 71 05/09/2021    BILITOT 1.4 (H) 05/09/2021     No results found for: "MG", "PHOS"    STUDIES:  U/S images and reports were personally reviewed.        ASSESSMENT & PLAN:  43 y.o. female with small asymptomatic fat containing incisional hernia.  Discussed surgery vs observation.  She will continue observation which is reasonable. Happy to see her back anytime if the hernia should worsen.      Chandu Rivera M.D., F.A.C.S.  [facility]  [facility] [place] [facility] [place]        "

## 2025-05-16 ENCOUNTER — OFFICE VISIT (OUTPATIENT)
Dept: URGENT CARE | Facility: CLINIC | Age: 45
End: 2025-05-16
Payer: COMMERCIAL

## 2025-05-16 VITALS
HEART RATE: 78 BPM | BODY MASS INDEX: 25.79 KG/M2 | WEIGHT: 141 LBS | RESPIRATION RATE: 18 BRPM | DIASTOLIC BLOOD PRESSURE: 79 MMHG | TEMPERATURE: 98 F | SYSTOLIC BLOOD PRESSURE: 133 MMHG | OXYGEN SATURATION: 98 %

## 2025-05-16 DIAGNOSIS — R30.0 DYSURIA: Primary | ICD-10-CM

## 2025-05-16 DIAGNOSIS — N39.0 URINARY TRACT INFECTION WITH HEMATURIA, SITE UNSPECIFIED: ICD-10-CM

## 2025-05-16 DIAGNOSIS — R31.9 URINARY TRACT INFECTION WITH HEMATURIA, SITE UNSPECIFIED: ICD-10-CM

## 2025-05-16 LAB
BILIRUBIN, UA POC OHS: NEGATIVE
BLOOD, UA POC OHS: ABNORMAL
CLARITY, UA POC OHS: CLEAR
COLOR, UA POC OHS: YELLOW
GLUCOSE, UA POC OHS: NEGATIVE
KETONES, UA POC OHS: NEGATIVE
LEUKOCYTES, UA POC OHS: NEGATIVE
NITRITE, UA POC OHS: NEGATIVE
PH, UA POC OHS: 7
PROTEIN, UA POC OHS: ABNORMAL
SPECIFIC GRAVITY, UA POC OHS: 1.02
UROBILINOGEN, UA POC OHS: 0.2

## 2025-05-16 RX ORDER — NITROFURANTOIN 25; 75 MG/1; MG/1
100 CAPSULE ORAL 2 TIMES DAILY
Qty: 14 CAPSULE | Refills: 0 | Status: SHIPPED | OUTPATIENT
Start: 2025-05-16

## 2025-05-16 RX ORDER — PHENAZOPYRIDINE HYDROCHLORIDE 100 MG/1
100 TABLET, FILM COATED ORAL 3 TIMES DAILY PRN
Qty: 9 TABLET | Refills: 0 | Status: SHIPPED | OUTPATIENT
Start: 2025-05-16 | End: 2025-05-19

## 2025-05-16 NOTE — PROGRESS NOTES
Subjective:      Patient ID: Nickie Clemente is a 44 y.o. female.    Vitals:  weight is 64 kg (141 lb). Her oral temperature is 97.8 °F (36.6 °C). Her blood pressure is 133/79 and her pulse is 78. Her respiration is 18 and oxygen saturation is 98%.     Chief Complaint: Dysuria    Pt presents with complaint of urinary urgency and frequency. Symptoms started today.     Dysuria   This is a new problem. The current episode started today. The problem occurs every urination. The problem has been unchanged. The pain is at a severity of 0/10. The patient is experiencing no pain. Associated symptoms include frequency, hesitancy and urgency. Pertinent negatives include no chills. She has tried nothing for the symptoms. The treatment provided no relief.       Constitution: Negative for chills, sweating, fatigue and fever.   Genitourinary:  Positive for frequency and urgency. Negative for dysuria.      Objective:     Physical Exam   Constitutional: She is oriented to person, place, and time. She appears well-developed. She is cooperative.  Non-toxic appearance. She does not appear ill. No distress.   HENT:   Head: Normocephalic and atraumatic.   Ears:   Right Ear: Hearing and external ear normal.   Left Ear: Hearing and external ear normal.   Nose: Nose normal. No mucosal edema, rhinorrhea, nasal deformity or congestion. No epistaxis. Right sinus exhibits no maxillary sinus tenderness and no frontal sinus tenderness. Left sinus exhibits no maxillary sinus tenderness and no frontal sinus tenderness.   Mouth/Throat: Uvula is midline, oropharynx is clear and moist and mucous membranes are normal. No trismus in the jaw. Normal dentition. No uvula swelling. No oropharyngeal exudate, posterior oropharyngeal edema or posterior oropharyngeal erythema.   Eyes: Conjunctivae and lids are normal. Pupils are equal, round, and reactive to light. No scleral icterus. Extraocular movement intact   Neck: Trachea normal and phonation  normal. Neck supple. No edema present. No erythema present. No neck rigidity present.   Cardiovascular: Normal rate, regular rhythm, normal heart sounds and normal pulses.   Pulmonary/Chest: Effort normal and breath sounds normal. No respiratory distress. She has no decreased breath sounds. She has no rhonchi.   Abdominal: Normal appearance. There is no rebound, no guarding, no left CVA tenderness and no right CVA tenderness.   Musculoskeletal: Normal range of motion.         General: No deformity. Normal range of motion.   Neurological: She is alert and oriented to person, place, and time. She exhibits normal muscle tone. Coordination normal.   Skin: Skin is warm, dry, intact, not diaphoretic and not pale.   Psychiatric: Her speech is normal and behavior is normal. Judgment and thought content normal.   Nursing note and vitals reviewed.    Results for orders placed or performed in visit on 05/16/25   POCT Urinalysis(Instrument)    Collection Time: 05/16/25 12:57 PM   Result Value Ref Range    Color, POC UA Yellow Yellow, Straw, Colorless    Clarity, POC UA Clear Clear    Glucose, POC UA Negative Negative    Bilirubin, POC UA Negative Negative    Ketones, POC UA Negative Negative    Spec Grav POC UA 1.020 1.005 - 1.030    Blood, POC UA Large (A) Negative    pH, POC UA 7.0 5.0 - 8.0    Protein, POC UA Trace (A) Negative    Urobilinogen, POC UA 0.2 <=1.0    Nitrite, POC UA Negative Negative    WBC, POC UA Negative Negative    No results found.     Assessment:     1. Dysuria    2. Urinary tract infection with hematuria, site unspecified        Plan:       Dysuria  -     POCT Urinalysis(Instrument)  -     phenazopyridine (PYRIDIUM) 100 MG tablet; Take 1 tablet (100 mg total) by mouth 3 (three) times daily as needed for Pain.  Dispense: 9 tablet; Refill: 0    Urinary tract infection with hematuria, site unspecified  -     nitrofurantoin, macrocrystal-monohydrate, (MACROBID) 100 MG capsule; Take 1 capsule (100 mg total)  by mouth 2 (two) times daily.  Dispense: 14 capsule; Refill: 0      Follow up if symptoms worsen or fail to improve, for F/U with PCP or ED. There are no Patient Instructions on file for this visit.

## 2025-07-29 ENCOUNTER — OFFICE VISIT (OUTPATIENT)
Dept: OBSTETRICS AND GYNECOLOGY | Facility: CLINIC | Age: 45
End: 2025-07-29
Payer: COMMERCIAL

## 2025-07-29 VITALS
BODY MASS INDEX: 25.07 KG/M2 | DIASTOLIC BLOOD PRESSURE: 74 MMHG | WEIGHT: 136.25 LBS | HEIGHT: 62 IN | SYSTOLIC BLOOD PRESSURE: 126 MMHG

## 2025-07-29 DIAGNOSIS — Z12.11 COLON CANCER SCREENING: ICD-10-CM

## 2025-07-29 DIAGNOSIS — Z30.09 ENCOUNTER FOR OTHER GENERAL COUNSELING OR ADVICE ON CONTRACEPTION: ICD-10-CM

## 2025-07-29 DIAGNOSIS — N92.6 LATE MENSES: ICD-10-CM

## 2025-07-29 DIAGNOSIS — Z01.419 ENCOUNTER FOR ANNUAL ROUTINE GYNECOLOGICAL EXAMINATION: Primary | ICD-10-CM

## 2025-07-29 DIAGNOSIS — Z12.31 BREAST CANCER SCREENING BY MAMMOGRAM: ICD-10-CM

## 2025-07-29 LAB
B-HCG UR QL: NEGATIVE
CTP QC/QA: YES

## 2025-07-29 PROCEDURE — 99999 PR PBB SHADOW E&M-EST. PATIENT-LVL III: CPT | Mod: PBBFAC,,, | Performed by: OBSTETRICS & GYNECOLOGY

## 2025-07-29 RX ORDER — METRONIDAZOLE 10 MG/G
GEL TOPICAL DAILY PRN
COMMUNITY
Start: 2025-07-08

## 2025-07-29 NOTE — PROGRESS NOTES
Chief Complaint: Well Woman Exam     HPI:      Nickie Clemente is a 44 y.o.  who presents today for well woman exam.  LMP: Patient's last menstrual period was 2025.  No issues, problems, or complaints. Specifically, patient denies abnormal vaginal bleeding, discharge, pelvic pain, urinary problems, or changes in appetite. Ms. Clemente is currently sexually active with a single male partner. She is currently using condoms and withdrawal for contraception. She declines STD screening today.    Previous Pap:  no abnormalities (2023)  Previous Mammogram:    Results for orders placed during the hospital encounter of 24    Mammo Digital Screening Bilat w/ Rivas    Narrative  Facility:  [address] [address] [phone]    Name: Nickie Clemente    MRN: [id number]    Result:  Mammo Digital Screening Bilat w/ Rivas    History:  Patient is 44 y.o. and is seen for a screening mammogram.    Films Compared:  Prior images (if available) were compared.    Findings:  This procedure was performed using tomosynthesis.  Computer-aided detection was utilized in the interpretation of this examination.    There is no evidence of suspicious masses, microcalcifications or architectural distortion.    Breast Density:  There are scattered areas of fibroglandular density.    Impression  No mammographic evidence of malignancy.    BI-RADS Category 1: Negative    Recommendation:  Routine screening mammogram in 1 year is recommended.    Your estimated lifetime risk of breast cancer (to age 85) based on Tyrer-Cuzick risk assessment model is 8.12%.  According to the [employer], patients with a lifetime breast cancer risk of 20% or higher might benefit from supplemental screening tests, such as screening breast MRI.    Alistair Nicholson MD     Most Recent Dexa: not indicated  Colonoscopy: never had    COVID vaccine: declined series  Gardasil:has never had  "    Problem List[1]    Past Medical History:   Diagnosis Date    GBS (group B Streptococcus carrier), +RV culture, currently pregnant 2023    Urinary incontinence        Past Surgical History:   Procedure Laterality Date    BREAST CYST EXCISION Right     MORE THAN 20 YEARS AGO     SECTION      x2     SECTION N/A 2023    Procedure:  SECTION;  Surgeon: Dory Leroy MD;  Location: [facility];  Service: OB/GYN;  Laterality: N/A;    LUMPECTOMY, BREAST Right        OB History          4    Para   4    Term   4            AB        Living   4         SAB        IAB        Ectopic        Multiple   0    Live Births   4           Obstetric Comments     No abnormal pap  No STDs   x 1, CD x 2               ROS:     Review of Systems   Constitutional:  Negative for activity change, fatigue and unexpected weight change.   Respiratory:  Negative for shortness of breath.    Cardiovascular:  Negative for chest pain.   Gastrointestinal:  Negative for abdominal pain, blood in stool, constipation and diarrhea.   Endocrine: Negative for cold intolerance and heat intolerance.   Genitourinary:  Negative for bladder incontinence, dyspareunia, dysuria, frequency, hot flashes, vaginal bleeding and vaginal dryness.   Integumentary:  Negative for breast mass, breast discharge and breast tenderness.   Psychiatric/Behavioral:  Negative for dysphoric mood. The patient is not nervous/anxious.    Breast: Negative for mass and tenderness      Physical Exam:      PHYSICAL EXAM:  /74   Ht 5' 2" (1.575 m)   Wt 61.8 kg (136 lb 3.9 oz)   LMP 2025   Breastfeeding No   BMI 24.92 kg/m²   Body mass index is 24.92 kg/m².     APPEARANCE: Well nourished, well developed, in no acute distress.  PSYCH: Appropriate mood and affect.  SKIN: No acne or hirsutism  NECK: Neck symmetric without masses or thyromegaly  NODES: No inguinal, axillary, or supraclavicular lymph node " enlargement  ABDOMEN: Soft.  No tenderness or masses.    CARDIOVASCULAR: No edema of peripheral extremities  BREASTS: Symmetrical, no skin changes or visible lesions.  No palpable masses or nipple discharge bilaterally.  PELVIC: Normal external genitalia without lesions.  Normal hair distribution.  Adequate perineal body, normal urethral meatus.  Vagina moist and well rugated without lesions or discharge.  Cervix pink, without lesions, discharge or tenderness.  No significant cystocele or rectocele.  Bimanual exam shows uterus to be normal size, regular, mobile and nontender.  Adnexa without masses or tenderness.      Results for orders placed or performed in visit on 07/29/25   POCT urine pregnancy    Collection Time: 07/29/25 11:06 AM   Result Value Ref Range    POC Preg Test, Ur Negative Negative     Acceptable Yes          Assessment:     1. Encounter for annual routine gynecological examination        2. Encounter for other general counseling or advice on contraception        3. Breast cancer screening by mammogram  Mammo Digital Screening Bilat w/ Rivas (XPD)      4. Late menses  POCT urine pregnancy      5. Colon cancer screening  Cologuard Screening (Multitarget Stool DNA)    Cologuard Screening (Multitarget Stool DNA)            Plan:     Clinical breast exam performed.  Pap UTD.  Mammogram ordered.  DEXA at 64.  Cologuard ordered.  Contraception: condoms, discussed limited efficacy of withdrawal method.   Follow up in about 1 year (around 7/29/2026) for annual well woman exam or as needed.    Counseling:     Patient was counseled today on current ASCCP pap guidelines, the recommendation for yearly pelvic exams, healthy diet and exercise routines, breast self awareness and annual mammograms.She is to see her PCP for other health maintenance.     Use of the [email or web address] Patient Portal discussed and encouraged during today's visit.         Dory Leroy MD  [facility]  2025               [1]   Patient Active Problem List  Diagnosis    Acute viral pharyngitis    Cough    Fever blister    Status post

## (undated) DEVICE — HEMOSTAT SURGICEL PWD 3G